# Patient Record
Sex: FEMALE | Race: WHITE | Employment: OTHER | URBAN - METROPOLITAN AREA
[De-identification: names, ages, dates, MRNs, and addresses within clinical notes are randomized per-mention and may not be internally consistent; named-entity substitution may affect disease eponyms.]

---

## 2018-09-22 ENCOUNTER — HOSPITAL ENCOUNTER (EMERGENCY)
Age: 78
Discharge: HOME | End: 2018-09-22
Payer: MEDICARE

## 2018-09-22 ENCOUNTER — APPOINTMENT (OUTPATIENT)
Dept: GENERAL RADIOLOGY | Age: 78
End: 2018-09-22
Payer: MEDICARE

## 2018-09-22 VITALS
DIASTOLIC BLOOD PRESSURE: 94 MMHG | TEMPERATURE: 98.2 F | HEART RATE: 81 BPM | OXYGEN SATURATION: 97 % | SYSTOLIC BLOOD PRESSURE: 148 MMHG | BODY MASS INDEX: 23.6 KG/M2 | HEIGHT: 61 IN | WEIGHT: 125 LBS | RESPIRATION RATE: 16 BRPM

## 2018-09-22 DIAGNOSIS — J40 BRONCHITIS: Primary | ICD-10-CM

## 2018-09-22 PROCEDURE — 6370000000 HC RX 637 (ALT 250 FOR IP): Performed by: PHYSICIAN ASSISTANT

## 2018-09-22 PROCEDURE — 71046 X-RAY EXAM CHEST 2 VIEWS: CPT

## 2018-09-22 PROCEDURE — 99283 EMERGENCY DEPT VISIT LOW MDM: CPT

## 2018-09-22 RX ORDER — ALBUTEROL SULFATE 90 UG/1
2 AEROSOL, METERED RESPIRATORY (INHALATION) EVERY 6 HOURS PRN
Qty: 1 INHALER | Refills: 0 | Status: SHIPPED | OUTPATIENT
Start: 2018-09-22 | End: 2018-10-03 | Stop reason: ALTCHOICE

## 2018-09-22 RX ORDER — PHENOL 1.4 %
1 AEROSOL, SPRAY (ML) MUCOUS MEMBRANE DAILY
Status: ON HOLD | COMMUNITY
End: 2020-01-01 | Stop reason: HOSPADM

## 2018-09-22 RX ORDER — IPRATROPIUM BROMIDE AND ALBUTEROL SULFATE 2.5; .5 MG/3ML; MG/3ML
1 SOLUTION RESPIRATORY (INHALATION)
Status: DISCONTINUED | OUTPATIENT
Start: 2018-09-22 | End: 2018-09-22 | Stop reason: HOSPADM

## 2018-09-22 RX ORDER — PANTOPRAZOLE SODIUM 40 MG/1
40 GRANULE, DELAYED RELEASE ORAL
COMMUNITY
End: 2019-08-13 | Stop reason: ALTCHOICE

## 2018-09-22 RX ORDER — CEFDINIR 300 MG/1
300 CAPSULE ORAL 2 TIMES DAILY
Qty: 20 CAPSULE | Refills: 0 | Status: SHIPPED | OUTPATIENT
Start: 2018-09-22 | End: 2018-10-02

## 2018-09-22 RX ORDER — PREDNISONE 10 MG/1
40 TABLET ORAL DAILY
Qty: 20 TABLET | Refills: 0 | Status: SHIPPED | OUTPATIENT
Start: 2018-09-22 | End: 2018-09-27

## 2018-09-22 RX ADMIN — IPRATROPIUM BROMIDE AND ALBUTEROL SULFATE 1 AMPULE: .5; 3 SOLUTION RESPIRATORY (INHALATION) at 13:56

## 2018-10-03 ENCOUNTER — HOSPITAL ENCOUNTER (EMERGENCY)
Age: 78
Discharge: HOME | End: 2018-10-03
Attending: EMERGENCY MEDICINE
Payer: MEDICARE

## 2018-10-03 ENCOUNTER — APPOINTMENT (OUTPATIENT)
Dept: GENERAL RADIOLOGY | Age: 78
End: 2018-10-03
Payer: MEDICARE

## 2018-10-03 VITALS
WEIGHT: 125 LBS | TEMPERATURE: 98.1 F | HEIGHT: 62 IN | HEART RATE: 74 BPM | SYSTOLIC BLOOD PRESSURE: 144 MMHG | RESPIRATION RATE: 16 BRPM | OXYGEN SATURATION: 98 % | DIASTOLIC BLOOD PRESSURE: 84 MMHG | BODY MASS INDEX: 23 KG/M2

## 2018-10-03 DIAGNOSIS — T50.905A ADVERSE EFFECT OF DRUG, INITIAL ENCOUNTER: ICD-10-CM

## 2018-10-03 DIAGNOSIS — E87.6 HYPOKALEMIA: ICD-10-CM

## 2018-10-03 DIAGNOSIS — R61 DIAPHORESIS: Primary | ICD-10-CM

## 2018-10-03 DIAGNOSIS — T88.7XXA MEDICATION SIDE EFFECT: ICD-10-CM

## 2018-10-03 LAB
ALBUMIN SERPL-MCNC: 4.3 G/DL (ref 3.5–5.2)
ALP BLD-CCNC: 68 U/L (ref 35–104)
ALT SERPL-CCNC: 8 U/L (ref 0–32)
AMYLASE: 69 U/L (ref 20–100)
ANION GAP SERPL CALCULATED.3IONS-SCNC: 14 MMOL/L (ref 7–16)
APTT: 32.2 SEC (ref 25.7–34.7)
AST SERPL-CCNC: 20 U/L (ref 0–31)
BACTERIA: ABNORMAL /HPF
BASOPHILS ABSOLUTE: 0.02 E9/L (ref 0–0.2)
BASOPHILS RELATIVE PERCENT: 0.2 % (ref 0–2)
BILIRUB SERPL-MCNC: 0.4 MG/DL (ref 0–1.2)
BILIRUBIN URINE: NEGATIVE
BLOOD, URINE: ABNORMAL
BUN BLDV-MCNC: 12 MG/DL (ref 8–23)
CALCIUM SERPL-MCNC: 9.5 MG/DL (ref 8.6–10.2)
CHLORIDE BLD-SCNC: 100 MMOL/L (ref 98–107)
CLARITY: CLEAR
CO2: 28 MMOL/L (ref 22–29)
COLOR: YELLOW
CREAT SERPL-MCNC: 0.8 MG/DL (ref 0.5–1)
EOSINOPHILS ABSOLUTE: 0.05 E9/L (ref 0.05–0.5)
EOSINOPHILS RELATIVE PERCENT: 0.6 % (ref 0–6)
GFR AFRICAN AMERICAN: >60
GFR NON-AFRICAN AMERICAN: >60 ML/MIN/1.73
GLUCOSE BLD-MCNC: 103 MG/DL (ref 74–109)
GLUCOSE URINE: NEGATIVE MG/DL
HCT VFR BLD CALC: 45.3 % (ref 34–48)
HEMOGLOBIN: 15.1 G/DL (ref 11.5–15.5)
IMMATURE GRANULOCYTES #: 0.04 E9/L
IMMATURE GRANULOCYTES %: 0.4 % (ref 0–5)
INR BLD: 1
KETONES, URINE: NEGATIVE MG/DL
LACTIC ACID, SEPSIS: 1.7 MMOL/L (ref 0.5–1.9)
LEUKOCYTE ESTERASE, URINE: ABNORMAL
LIPASE: 65 U/L (ref 13–60)
LYMPHOCYTES ABSOLUTE: 1.13 E9/L (ref 1.5–4)
LYMPHOCYTES RELATIVE PERCENT: 12.6 % (ref 20–42)
MCH RBC QN AUTO: 30.6 PG (ref 26–35)
MCHC RBC AUTO-ENTMCNC: 33.3 % (ref 32–34.5)
MCV RBC AUTO: 91.9 FL (ref 80–99.9)
MONOCYTES ABSOLUTE: 0.43 E9/L (ref 0.1–0.95)
MONOCYTES RELATIVE PERCENT: 4.8 % (ref 2–12)
NEUTROPHILS ABSOLUTE: 7.29 E9/L (ref 1.8–7.3)
NEUTROPHILS RELATIVE PERCENT: 81.4 % (ref 43–80)
NITRITE, URINE: NEGATIVE
PDW BLD-RTO: 13 FL (ref 11.5–15)
PH UA: 7 (ref 5–9)
PLATELET # BLD: 184 E9/L (ref 130–450)
PMV BLD AUTO: 10.8 FL (ref 7–12)
POTASSIUM SERPL-SCNC: 3 MMOL/L (ref 3.5–5)
PRO-BNP: 130 PG/ML (ref 0–450)
PROTEIN UA: NEGATIVE MG/DL
PROTHROMBIN TIME: 11.4 SEC (ref 9.3–12.4)
RBC # BLD: 4.93 E12/L (ref 3.5–5.5)
RBC UA: ABNORMAL /HPF (ref 0–2)
SODIUM BLD-SCNC: 142 MMOL/L (ref 132–146)
SPECIFIC GRAVITY UA: 1.01 (ref 1–1.03)
TOTAL PROTEIN: 7.2 G/DL (ref 6.4–8.3)
TROPONIN: <0.01 NG/ML (ref 0–0.03)
UROBILINOGEN, URINE: 0.2 E.U./DL
WBC # BLD: 9 E9/L (ref 4.5–11.5)
WBC UA: ABNORMAL /HPF (ref 0–5)

## 2018-10-03 PROCEDURE — 87088 URINE BACTERIA CULTURE: CPT

## 2018-10-03 PROCEDURE — 82150 ASSAY OF AMYLASE: CPT

## 2018-10-03 PROCEDURE — 36415 COLL VENOUS BLD VENIPUNCTURE: CPT

## 2018-10-03 PROCEDURE — 99283 EMERGENCY DEPT VISIT LOW MDM: CPT

## 2018-10-03 PROCEDURE — 93005 ELECTROCARDIOGRAM TRACING: CPT | Performed by: EMERGENCY MEDICINE

## 2018-10-03 PROCEDURE — 87040 BLOOD CULTURE FOR BACTERIA: CPT

## 2018-10-03 PROCEDURE — 84484 ASSAY OF TROPONIN QUANT: CPT

## 2018-10-03 PROCEDURE — 85730 THROMBOPLASTIN TIME PARTIAL: CPT

## 2018-10-03 PROCEDURE — 85025 COMPLETE CBC W/AUTO DIFF WBC: CPT

## 2018-10-03 PROCEDURE — 83880 ASSAY OF NATRIURETIC PEPTIDE: CPT

## 2018-10-03 PROCEDURE — 2580000003 HC RX 258: Performed by: EMERGENCY MEDICINE

## 2018-10-03 PROCEDURE — 83690 ASSAY OF LIPASE: CPT

## 2018-10-03 PROCEDURE — 87077 CULTURE AEROBIC IDENTIFY: CPT

## 2018-10-03 PROCEDURE — 71045 X-RAY EXAM CHEST 1 VIEW: CPT

## 2018-10-03 PROCEDURE — 80053 COMPREHEN METABOLIC PANEL: CPT

## 2018-10-03 PROCEDURE — 85610 PROTHROMBIN TIME: CPT

## 2018-10-03 PROCEDURE — 87186 SC STD MICRODIL/AGAR DIL: CPT

## 2018-10-03 PROCEDURE — 81001 URINALYSIS AUTO W/SCOPE: CPT

## 2018-10-03 PROCEDURE — 83605 ASSAY OF LACTIC ACID: CPT

## 2018-10-03 RX ORDER — METOCLOPRAMIDE 10 MG/1
10 TABLET ORAL 3 TIMES DAILY
COMMUNITY
End: 2018-10-03 | Stop reason: ALTCHOICE

## 2018-10-03 RX ORDER — 0.9 % SODIUM CHLORIDE 0.9 %
500 INTRAVENOUS SOLUTION INTRAVENOUS ONCE
Status: COMPLETED | OUTPATIENT
Start: 2018-10-03 | End: 2018-10-03

## 2018-10-03 RX ORDER — POTASSIUM CHLORIDE 20 MEQ/1
20 TABLET, EXTENDED RELEASE ORAL DAILY
Qty: 60 TABLET | Refills: 0 | Status: SHIPPED | OUTPATIENT
Start: 2018-10-03 | End: 2019-08-13

## 2018-10-03 RX ADMIN — SODIUM CHLORIDE 500 ML: 9 INJECTION, SOLUTION INTRAVENOUS at 05:39

## 2018-10-03 NOTE — ED PROVIDER NOTES
surgery (1996); Breast lumpectomy (Left, 2008); and Cystoscopy (6/18/2015). Social History:  reports that she has never smoked. She has never used smokeless tobacco. She reports that she drinks alcohol. She reports that she does not use drugs. Family History: family history is not on file. The patients home medications have been reviewed.     Allergies: Morphine    -------------------------------------------------- RESULTS -------------------------------------------------  All laboratory and radiology results have been personally reviewed by myself   LABS:  Results for orders placed or performed during the hospital encounter of 10/03/18   CBC auto differential   Result Value Ref Range    WBC 9.0 4.5 - 11.5 E9/L    RBC 4.93 3.50 - 5.50 E12/L    Hemoglobin 15.1 11.5 - 15.5 g/dL    Hematocrit 45.3 34.0 - 48.0 %    MCV 91.9 80.0 - 99.9 fL    MCH 30.6 26.0 - 35.0 pg    MCHC 33.3 32.0 - 34.5 %    RDW 13.0 11.5 - 15.0 fL    Platelets 615 244 - 941 E9/L    MPV 10.8 7.0 - 12.0 fL    Neutrophils % 81.4 (H) 43.0 - 80.0 %    Immature Granulocytes % 0.4 0.0 - 5.0 %    Lymphocytes % 12.6 (L) 20.0 - 42.0 %    Monocytes % 4.8 2.0 - 12.0 %    Eosinophils % 0.6 0.0 - 6.0 %    Basophils % 0.2 0.0 - 2.0 %    Neutrophils # 7.29 1.80 - 7.30 E9/L    Immature Granulocytes # 0.04 E9/L    Lymphocytes # 1.13 (L) 1.50 - 4.00 E9/L    Monocytes # 0.43 0.10 - 0.95 E9/L    Eosinophils # 0.05 0.05 - 0.50 E9/L    Basophils # 0.02 0.00 - 0.20 E9/L   Urinalysis   Result Value Ref Range    Color, UA Yellow Straw/Yellow    Clarity, UA Clear Clear    Glucose, Ur Negative Negative mg/dL    Bilirubin Urine Negative Negative    Ketones, Urine Negative Negative mg/dL    Specific Gravity, UA 1.010 1.005 - 1.030    Blood, Urine TRACE (A) Negative    pH, UA 7.0 5.0 - 9.0    Protein, UA Negative Negative mg/dL    Urobilinogen, Urine 0.2 <2.0 E.U./dL    Nitrite, Urine Negative Negative    Leukocyte Esterase, Urine TRACE (A) Negative   Lactate, Sepsis Result Value Ref Range    Lactic Acid, Sepsis 1.7 0.5 - 1.9 mmol/L   APTT   Result Value Ref Range    aPTT 32.2 25.7 - 34.7 sec   Protime-INR   Result Value Ref Range    Protime 11.4 9.3 - 12.4 sec    INR 1.0    Amylase   Result Value Ref Range    Amylase 69 20 - 100 U/L   Lipase   Result Value Ref Range    Lipase 65 (H) 13 - 60 U/L   Troponin   Result Value Ref Range    Troponin <0.01 0.00 - 0.03 ng/mL   Comprehensive Metabolic Panel   Result Value Ref Range    Sodium 142 132 - 146 mmol/L    Potassium 3.0 (L) 3.5 - 5.0 mmol/L    Chloride 100 98 - 107 mmol/L    CO2 28 22 - 29 mmol/L    Anion Gap 14 7 - 16 mmol/L    Glucose 103 74 - 109 mg/dL    BUN 12 8 - 23 mg/dL    CREATININE 0.8 0.5 - 1.0 mg/dL    GFR Non-African American >60 >=60 mL/min/1.73    GFR African American >60     Calcium 9.5 8.6 - 10.2 mg/dL    Total Protein 7.2 6.4 - 8.3 g/dL    Alb 4.3 3.5 - 5.2 g/dL    Total Bilirubin 0.4 0.0 - 1.2 mg/dL    Alkaline Phosphatase 68 35 - 104 U/L    ALT 8 0 - 32 U/L    AST 20 0 - 31 U/L   Brain Natriuretic Peptide   Result Value Ref Range    Pro- 0 - 450 pg/mL   Microscopic Urinalysis   Result Value Ref Range    WBC, UA 0-1 0 - 5 /HPF    RBC, UA NONE 0 - 2 /HPF    Bacteria, UA RARE (A) /HPF   EKG 12 lead   Result Value Ref Range    Ventricular Rate 71 BPM    Atrial Rate 71 BPM    P-R Interval 144 ms    QRS Duration 82 ms    Q-T Interval 388 ms    QTc Calculation (Bazett) 421 ms    P Axis 44 degrees    R Axis 14 degrees    T Axis 62 degrees       RADIOLOGY:  Interpreted by Radiologist.  XR CHEST 1 VW   Final Result   Tortuous ectatic aorta   Pulmonary nodule at the left lung base measuring 8 mm                      ------------------------- NURSING NOTES AND VITALS REVIEWED ---------------------------  The nursing notes within the ED encounter and vital signs as below have been reviewed.    BP (!) 144/84   Pulse 74   Temp 98.1 °F (36.7 °C) (Oral)   Resp 16   Ht 5' 2\" (1.575 m)   Wt 125 lb (56.7 kg)   SpO2 98%   BMI 22.86 kg/m²   Oxygen Saturation Interpretation: Normal      ---------------------------------------------------PHYSICAL EXAM--------------------------------------      Constitutional/General: Alert and oriented x3, well appearing, non toxic in NAD at this time  Head: Normocephalic and atraumatic  Eyes: PERRL, EOMI, no scleral icterus, no photophobia  Mouth: Oropharynx clear, handling secretions, no trismus  Neck: Supple, full ROM, Trachea midline, no JVD  Pulmonary: Lungs clear to auscultation bilaterally, no wheezes, rales, or rhonchi. Not in respiratory distress  Cardiovascular:  Regular rate and rhythm, no murmurs, gallops, or rubs. 2+ distal pulses  Abdomen: Soft, non tender, non distended, no HPSM, no masses, no rebound no guarding, no rigidity, no pulsatile masses. Normal BS  Extremities: Moves all extremities x 4. Warm and well perfused; no c/c/e, no clinical signs of DVT. Skin: warm and dry without rash; no diaphoresis present at this time. Neurologic: GCS 15, CN's 2-12 grossly intact, no facial asymetry nor droop. No motor weakness, no sensory deficits, No meningeal signs. Psych: Normal Affect; no signs of depression nor psychosis.    ------------------------------ ED COURSE/MEDICAL DECISION MAKING----------------------  Medications   0.9 % sodium chloride bolus (0 mLs Intravenous Stopped 10/3/18 0609)       ED COURSE:     Medical Decision Making:   Differential Diagnoses:  Medication Reaction, Pneumonia,MI/ Anginal Equivalent,  UTI, Sepsis,  Metabolic/Electrolyte disorder, Vasovagal reaction, Cardiac Dysrhythmia, Dehydration, to name a few    EKG #1:  Interpreted by emergency department physician unless otherwise noted. Time:  04:53    Rate: 75  Rhythm: Sinus. Interpretation: nonspecific ST and T waves changes. No ischemic changes compared to old EKG 10/05/2012    Counseling:   The emergency provider has spoken with the patient and family member patient and daughter and discussed todays

## 2018-10-03 NOTE — ED NOTES
IV removed. Dressing applied. Discharge instructions and prescription given. Patient states verbal understanding. Ambulatory to exit.        Sabi Rowley RN  10/03/18 [id number]

## 2018-10-05 LAB
EKG ATRIAL RATE: 71 BPM
EKG P AXIS: 44 DEGREES
EKG P-R INTERVAL: 144 MS
EKG Q-T INTERVAL: 388 MS
EKG QRS DURATION: 82 MS
EKG QTC CALCULATION (BAZETT): 421 MS
EKG R AXIS: 14 DEGREES
EKG T AXIS: 62 DEGREES
EKG VENTRICULAR RATE: 71 BPM

## 2018-10-08 LAB
BLOOD CULTURE, ROUTINE: NORMAL
CULTURE, BLOOD 2: NORMAL
ORGANISM: ABNORMAL
URINE CULTURE, ROUTINE: ABNORMAL
URINE CULTURE, ROUTINE: ABNORMAL

## 2019-01-01 ENCOUNTER — TELEPHONE (OUTPATIENT)
Dept: CASE MANAGEMENT | Age: 79
End: 2019-01-01

## 2019-01-01 ENCOUNTER — HOSPITAL ENCOUNTER (OUTPATIENT)
Dept: RADIATION ONCOLOGY | Age: 79
Discharge: HOME | End: 2019-11-25
Attending: RADIOLOGY
Payer: MEDICARE

## 2019-01-01 DIAGNOSIS — C21.0 ANAL CANCER (HCC): Primary | ICD-10-CM

## 2019-01-01 PROCEDURE — 99212 OFFICE O/P EST SF 10 MIN: CPT

## 2019-01-01 PROCEDURE — 99212 OFFICE O/P EST SF 10 MIN: CPT | Performed by: RADIOLOGY

## 2019-03-21 ENCOUNTER — HOSPITAL ENCOUNTER (OUTPATIENT)
Age: 79
Discharge: HOME | End: 2019-03-23

## 2019-03-21 PROCEDURE — 88331 PATH CONSLTJ SURG 1 BLK 1SPC: CPT

## 2019-03-21 PROCEDURE — 88342 IMHCHEM/IMCYTCHM 1ST ANTB: CPT

## 2019-03-21 PROCEDURE — 88309 TISSUE EXAM BY PATHOLOGIST: CPT

## 2019-03-21 PROCEDURE — 88341 IMHCHEM/IMCYTCHM EA ADD ANTB: CPT

## 2019-04-17 NOTE — PROGRESS NOTES
Toan Sanon  1940 [de-identified] y.o. Referring Physician:  Dr Lázaro Alonzo    PCP: Javier Jackson MD     There were no vitals filed for this visit. Wt Readings from Last 3 Encounters:   10/03/18 125 lb (56.7 kg)   09/22/18 125 lb (56.7 kg)   06/16/15 123 lb (55.8 kg)        There is no height or weight on file to calculate BMI. Chief Complaint: No chief complaint on file. Cancer Staging  No matching staging information was found for the patient. Prior Radiation Therapy? YES: Site Treated: l breast          Facility: [facility]          Date: 2009    Concurrent Chemo/radiation? NO    Prior Chemotherapy? NO    Prior Hormonal Therapy? YES: Site Treated: l breast          Facility: -          Date: 2009    Head and Neck Cancer? No, patient does NOT have HN cancer. Current Outpatient Medications   Medication Sig Dispense Refill    potassium chloride (KLOR-CON M) 20 MEQ extended release tablet Take 1 tablet by mouth daily 60 tablet 0    pantoprazole sodium (PROTONIX) 40 MG PACK packet Take 40 mg by mouth every morning (before breakfast)      calcium carbonate 600 MG TABS tablet Take 1 tablet by mouth daily      omeprazole (PRILOSEC) 10 MG capsule Take 10 mg by mouth daily      vitamin B-12 (CYANOCOBALAMIN) 1000 MCG tablet Take 1,000 mcg by mouth daily      magnesium gluconate (MAGONATE) 500 MG tablet Take 500 mg by mouth nightly      pravastatin (PRAVACHOL) 20 MG tablet Take 20 mg by mouth daily.  hydrochlorothiazide (HYDRODIURIL) 25 MG tablet Take 25 mg by mouth daily. No current facility-administered medications for this encounter.         Past Medical History:   Diagnosis Date    Breast [facility]) 2008    Hyperlipidemia     Hypertension     Kidney stone     Water retention        Past Surgical History:   Procedure Laterality Date    BACK SURGERY  1996    BREAST LUMPECTOMY Left 2008    CYSTOSCOPY  6/18/2015    cysto, retro, pyelogram, laser lithotripsy, stent insertion    EYE SURGERY Bilateral     HYSTERECTOMY         No family history on file. Social History     Socioeconomic History    Marital status:      Spouse name: Not on file    Number of children: Not on file    Years of education: Not on file    Highest education level: Not on file   Occupational History    Not on file   Social Needs    Financial resource strain: Not on file    Food insecurity:     Worry: Not on file     Inability: Not on file    Transportation needs:     Medical: Not on file     Non-medical: Not on file   Tobacco Use    Smoking status: Never Smoker    Smokeless tobacco: Never Used   Substance and Sexual Activity    Alcohol use: Yes     Comment: sociaL    Drug use: No    Sexual activity: Not on file   Lifestyle    Physical activity:     Days per week: Not on file     Minutes per session: Not on file    Stress: Not on file   Relationships    Social connections:     Talks on phone: Not on file     Gets together: Not on file     Attends Taoist service: Not on file     Active member of club or organization: Not on file     Attends meetings of clubs or organizations: Not on file     Relationship status: Not on file    Intimate partner violence:     Fear of current or ex partner: Not on file     Emotionally abused: Not on file     Physically abused: Not on file     Forced sexual activity: Not on file   Other Topics Concern    Not on file   Social History Narrative    Not on file           Occupation: Retired  Retired:  YES: Patient is retired from office cleaning. REVIEW OF SYSTEMS: <<For Level 5, 10 or more systems>>     Marty is an [de-identified]year old female with a newly diagnosed anal cancer. She has a history of early stage breast cancer and treatment in 2009 with Dr Otoniel Hammer. She had a lumpectomy, adjuvant radiation followed by 5 years of aromatase inhibitors. (Anastrazole)  Her most recent mammogram in 2018 was negative.   Recently she developed a troublesome anal irritation with some blood tinged stools. 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 Primary tumor was 3cm. There was invasion of submucosa as well as deep margins of excision by invasive carcinoma and the inferior skin margin was also involved by high grade squamous intraepithelial lesion. 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 She states that she feels well, maintains an active lifestyle, and her bowel movements have been normal.  Port placement this Thursday 4/25/2019. CT Simulation (per Lort RT) Tuesday 4/23/19 at 12:00    Greater than 20 minutes was spent educating patient on radiation therapy, simulation, and self-care. Slide show, handout, and verbal presentation all utilized. This patient verbalized understanding and has no questions at this time. He was instructed to reach out to this clinic with any related needs and contact information was provided. Pacemaker/Defibulator/ICD:  No    .  Mediport: No        FALLS RISK SCREENING ASSESSMENT    Instructions:  Assess the patient and enter the appropriate indicators that are present for fall risk identification. Total the numbers entered and assign a fall risk score from Table 2.  Reassess patient at a minimum every 12 weeks or with status change. Assessment   Date  4/17/2019     1. Mental Ability: confusion/cognitively impaired No - 0       2. Elimination Issues: incontinence, frequency No - 0       3. Ambulatory: use of assistive devices (walker, cane, off-loading devices), attached to equipment (IV pole, oxygen) No - 0     4. Sensory Limitations: dizziness, vertigo, impaired vision No - 0       5. Age 72 years or greater - 1       10.   Medication: diuretics, strong analgesics, hypnotics, sedatives, antihypertensive agents   No - 0   7. Falls:  recent history of falls within the last 3 months (not to include slipping or tripping)   No - 0   TOTAL 1    If score of 4 or greater was education given? No       TABLE 2   Risk Score Risk Level Plan of Care   0-3 Little or  No Risk 1. Provide assistance as indicated for ambulation activities  2. Reorient confused/cognitively impaired patient  3. Call-light/bell within patient's reach  4. Chair/bed in low position, stretcher/bed with siderails up except when performing patient care activities  5. Educate patient/family/caregiver on falls prevention  6.  Reassess in 12 weeks or with any noted change in patient condition which places them at a risk for a fall   4-6 Moderate Risk 1. Provide assistance as indicated for ambulation activities  2. Reorient confused/cognitively impaired patient  3. Call-light/bell within patient's reach  4. Chair/bed in low position, stretcher/bed with siderails up except when performing patient care activities  5. Educate patient/family/caregiver on falls prevention  6. Falls risk precaution (Yellow sticker Level II) placed on patient chart   7 or   Higher High Risk 1. Place patient in easily observable treatment room  2. Patient attended at all times by family member or staff  3. Provide assistance as indicated for ambulation activities  4. Reorient confused/cognitively impaired patient  5. Call-light/bell within patient's reach  6. Chair/bed in low position, stretcher/bed with siderails up except when performing patient care activities  7. Educate patient/family/caregiver on falls prevention  8. Falls risk precaution (Yellow sticker Level III) placed on patient chart           MALNUTRITION RISK SCREENING ASSESSMENT    Instructions:  Assess the patient and enter the appropriate indicators that are present for nutrition risk identification. Total the numbers entered and assign a risk score.  Follow the symptoms so a referral to Lymphedema Therapy can be considered. PREHAB AUDIOLOGY REFERRAL    - Is patient planned to receive Cisplatin? No. This patient is not planned to start Cisplatin. - Is patient planned to receive radiation therapy that may be directed toward auditory canals or nerves? No. Patient is not planned to start radiation therapy to auditory canals or nerves. - Is patient complaining of new onset hearing loss? No. Patient is not complaining of new onset hearing loss. Patient education given on Radiation therapy. The patient expresses understanding and acceptance of instructions.  Juventino Robertson 4/17/2019 3:39 PM           Juventino Robertson

## 2019-04-18 ENCOUNTER — HOSPITAL ENCOUNTER (OUTPATIENT)
Dept: CT IMAGING | Age: 79
Discharge: HOME | End: 2019-04-20
Payer: MEDICARE

## 2019-04-18 DIAGNOSIS — C21.1 ADENOCARCINOMA OF ANAL CANAL (HCC): ICD-10-CM

## 2019-04-18 PROCEDURE — 6360000004 HC RX CONTRAST MEDICATION: Performed by: RADIOLOGY

## 2019-04-18 PROCEDURE — 2580000003 HC RX 258: Performed by: INTERNAL MEDICINE

## 2019-04-18 PROCEDURE — 71260 CT THORAX DX C+: CPT

## 2019-04-18 PROCEDURE — 74177 CT ABD & PELVIS W/CONTRAST: CPT

## 2019-04-18 RX ORDER — SODIUM CHLORIDE 0.9 % (FLUSH) 0.9 %
10 SYRINGE (ML) INJECTION PRN
Status: DISCONTINUED | OUTPATIENT
Start: 2019-04-18 | End: 2019-04-21 | Stop reason: HOSPADM

## 2019-04-18 RX ADMIN — IOPAMIDOL 100 ML: 755 INJECTION, SOLUTION INTRAVENOUS at 14:10

## 2019-04-18 RX ADMIN — Medication 10 ML: at 14:11

## 2019-04-18 RX ADMIN — IOHEXOL 50 ML: 240 INJECTION, SOLUTION INTRATHECAL; INTRAVASCULAR; INTRAVENOUS; ORAL at 14:10

## 2019-04-22 ENCOUNTER — TELEPHONE (OUTPATIENT)
Dept: RADIATION ONCOLOGY | Age: 79
End: 2019-04-22

## 2019-04-22 ENCOUNTER — HOSPITAL ENCOUNTER (OUTPATIENT)
Dept: RADIATION ONCOLOGY | Age: 79
Discharge: HOME | End: 2019-04-22
Payer: MEDICARE

## 2019-04-22 ENCOUNTER — TELEPHONE (OUTPATIENT)
Dept: CASE MANAGEMENT | Age: 79
End: 2019-04-22

## 2019-04-22 DIAGNOSIS — C21.0 ANAL CANCER (HCC): Primary | ICD-10-CM

## 2019-04-22 PROCEDURE — 99205 OFFICE O/P NEW HI 60 MIN: CPT

## 2019-04-22 PROCEDURE — 99204 OFFICE O/P NEW MOD 45 MIN: CPT | Performed by: RADIOLOGY

## 2019-04-22 SDOH — ECONOMIC STABILITY: HOUSING INSECURITY: PLEASE ASSESS YOUR PATIENT'S LEVEL OF DISTRESS CONCERNING HOUSING (SCALE FROM 1-10): 1

## 2019-04-22 NOTE — TELEPHONE ENCOUNTER
Met with patient and her significant other after her initial consult appointment with Dr. Ra Hart for her recent diagnosis of second primary invasive carcinoma with deep and inferior margins positive status post excision biopsy on 3/5/19. I explained my role as Nurse Navigator and provided my contact information. Patient is scheduled for her CT-simulation on 4/23/19 and is having her mediport placed on 4/24/19. Currently, she is scheduled to start chemotherapy on 4/29/19 per Dr. Jelly Downey. I advised patient that, preferably, chemotherapy and radiation should start on the same day and that I do not expect her radiation plan to be completed by that time. I told her that I will call Dr. Eh Mendoza office to report such and she should keep whatever appointment they advise her of. I advised patient of Freddie Zepeda RD and told her that Xi Bey will be meeting with her soon to make dietary recommendations / guidance to her throughout treatment for possible diarrhea. She denies any current transportation, emotional, or financial issues. Patient states she has a very strong family / friend support group. She is very active and dances with her significant other on a weekly basis. I provided literature consisting of Cancer Guide (Patient Resource), our listing of Local Resources for the Cancer Survivor, and [address] [place] [address]. I encouraged her to stop by my office or contact me with any future questions and/or concerns. Understanding was verbalized of all. Afterwards, I consulted with Dr. Ra Hart who states radiation should be ready to start on 5/6/19. I called Dr. Eh Mendoza office and spoke with Matthew Tarango RN to advise. Delta Severs states Dr. Jelly Downey is out of the office all this week. She would either have patient keep her existing 4/29/19 appointment there to follow up with Dr. Jelly Downey and have CBC done or she will arrange to have patient rescheduled for all, including chemotherapy start, for 5/6/19.

## 2019-04-22 NOTE — ADDENDUM NOTE
Encounter addended by: EJ Khan on: 4/22/2019 2:42 PM   Actions taken: Charge Capture section accepted

## 2019-04-22 NOTE — ADDENDUM NOTE
Encounter addended by: Jessica Napier RN on: 4/22/2019 2:30 PM   Actions taken: Charge Capture section accepted

## 2019-04-22 NOTE — TELEPHONE ENCOUNTER
At rad onc consult, spoke with pt re: ACS programs, resources, and role of ACS navigator's contact information. Pt stated that she has no needs at this time but would follow up if needs arise. Provided with ACS navigator's contact information.

## 2019-04-22 NOTE — PROGRESS NOTES
Radiation Oncology Consult Note         4/22/2019    Lyndon Peralta  [de-identified] y.o.   1940        Referring Physician: Dr. Delisa Verde        PCP:  Yolanda Borrego MD      DIAGNOSIS:   Invasive carcinoma anal canal, basaloid squamous, stage T2 N0 M0-II      History of Present Illness:   Ms. Lyndon Peralta  is a [de-identified]y.o. year old female, with past history of left breast stage I invasive carcinoma, status post lumpectomy and radiation therapy 2009 followed by 5 years of anastrozole. She has history of hemorrhoids. 6 months ago she had a colonoscopy which was negative. Treatment for hemorrhoids didn't work and she continued to have periodic anal bleeding. No soreness, incontinence or weight loss. No systemic symptoms. She had an  EUA on 3/25/19. Underwent transanal excision biopsy of the anal lesion, showing  invasive carcinoma, basaloid squamous. Deep and inferior margins positive. See report below  CANCER CASE SUMMARY  Specimen: Anorectal junction  Procedure: Local excision (transanal excision)  Specimen Integrity: Intact  Tumor Site: Anorectal junction  Tumor Size: 3.0x1.3x0.3 cm  Histologic Type: Basaloid squamous cell carcinoma  Histologic Grade: G3 (poorly differentiated)  Microscopic Tumor Extension: Tumor invades submucosa  Margins:       Deep (posterior/submucosal) margin: Focally involved by invasive  carcinoma (A4)       Inferior/anal skin margin: Focally involved by high grade squamous  intraepithelial lesion (A5)  Treatment Effect: No known presurgical therapy  Lymph-Vascular Invasion: Not identified  Perineural Invasion: Not identified  Pathologic Staging (pTNM, AJCC 8th Edition)   Primary tumor (pT): pT2 (tumor </= 5cm)  Additional Pathologic Findings: High-grade squamous intraepithelial  lesion/in situ carcinoma and condyloma   acuminata    CT scan of the chest abdomen pelvis from 4/18/19,  negative for local or metastatic disease.     Past Medical History:      Diagnosis Date    [facility]) 2008    Hyperlipidemia     Hypertension     Kidney stone     Water retention        Past Surgical History:      Procedure Laterality Date    BACK SURGERY  1996    BREAST LUMPECTOMY Left 2008    CYSTOSCOPY  6/18/2015    cysto, retro, pyelogram, laser lithotripsy, stent insertion    EYE SURGERY Bilateral     HYSTERECTOMY         Allergies   Allergen Reactions    Morphine Other (See Comments)     \"not myself\"    Reglan [Metoclopramide] Other (See Comments)     Anxiety, diaphoresis       Medications:  Medications reviewed and reconciled. Current Outpatient Medications   Medication Sig Dispense Refill    potassium chloride (KLOR-CON M) 20 MEQ extended release tablet Take 1 tablet by mouth daily 60 tablet 0    pantoprazole sodium (PROTONIX) 40 MG PACK packet Take 40 mg by mouth every morning (before breakfast)      calcium carbonate 600 MG TABS tablet Take 1 tablet by mouth daily      omeprazole (PRILOSEC) 10 MG capsule Take 10 mg by mouth daily      vitamin B-12 (CYANOCOBALAMIN) 1000 MCG tablet Take 1,000 mcg by mouth daily      magnesium gluconate (MAGONATE) 500 MG tablet Take 500 mg by mouth nightly      pravastatin (PRAVACHOL) 20 MG tablet Take 20 mg by mouth daily.  hydrochlorothiazide (HYDRODIURIL) 25 MG tablet Take 25 mg by mouth daily. No current facility-administered medications for this encounter. ObGyn history-[place]/BSO several years ago for benign reasons      Family History:  History reviewed. No pertinent family history. Social History:       reports that she has never smoked. She has never used smokeless tobacco..   reports that she drinks alcohol. .   reports that she does not use drugs. Review of Systems:  Obtained from the patient, chart review and nursing assessment. Negative otherwise    Constitutional:  No fever, chills or night sweats. Denies recent weight loss. Eyes:  No blurred or changes in vision. Denies discharge or pain.   ENT:  No headaches, hearing loss or vertigo. No mouth sores or sore throat. No change in taste or smell. Cardiovascular:  No chest discomfort, dyspnea on exertion or palpitations. Respiratory: Has no cough or wheezing. Has no sputum production or hemoptysis. Has no pleuritic pain. Gastrointestinal: No abdominal pain, appetite loss or nausea. No change in bowel habits. No hematochezia or melena. Genitourinary: Patient acknowledges no dysuria, trouble voiding, urgency or hematuria. No nocturia or increased frequency. Musculoskeletal: No gait disturbance, weakness or joint complaints. Integumentary: No rash or pruritis. Neurological: No headache, diplopia, syncope, change in muscle strength, numbness or tingling. No change in gait, balance, coordination, mood, affect, memory, mentation, behavior. Psychiatric: No anxiety, or depression. Endocrine: No temperature intolerance. No excessive thirst, fluid intake, or urination. No tremor. Hematologic/Lymphatic: No abnormal bruising or bleeding, blood clots or swollen lymph nodes. Allergic/Immunologic: No nasal congestion or hives. Physical examination: There were no vitals filed for this visit. ECOG Performance Status: 0      Constitutional: Looks healthy    ENT: Throat is clear    Lymphatic: No peripheral lymphadenopathy    Breast exam: Left breast with mild fibrosis. No signs of recurrence. Right breast normal to palpation    Respiratory: Lungs clear bilaterally    Cardiovascular:.  S1-S2 RRR      Abdomen:  Benign    Musculoskeletal:Extremities: .   Trace bipedal edema    CNS: Grossly non-focal    Radiation safety and oncologic treatment support:    - Previous radiation history:  No  - History of autoimmune or connective tissue disease:  No  - Nutritional support/ PEG:  Not applicable  - Dental evaluation:  Not applicable  -  requested:  Not asked for.  - Oncology Nurse navigator requested:  - Transportation for daily treatment:  Self    Other

## 2019-04-23 ENCOUNTER — APPOINTMENT (OUTPATIENT)
Dept: RADIATION ONCOLOGY | Age: 79
End: 2019-04-23
Attending: RADIOLOGY
Payer: MEDICARE

## 2019-04-23 PROCEDURE — 77334 RADIATION TREATMENT AID(S): CPT | Performed by: RADIOLOGY

## 2019-04-29 PROCEDURE — 77300 RADIATION THERAPY DOSE PLAN: CPT | Performed by: RADIOLOGY

## 2019-04-29 PROCEDURE — 77301 RADIOTHERAPY DOSE PLAN IMRT: CPT | Performed by: RADIOLOGY

## 2019-04-29 PROCEDURE — 77338 DESIGN MLC DEVICE FOR IMRT: CPT | Performed by: RADIOLOGY

## 2019-05-06 ENCOUNTER — HOSPITAL ENCOUNTER (OUTPATIENT)
Dept: RADIATION ONCOLOGY | Age: 79
Discharge: HOME | End: 2019-05-06
Attending: RADIOLOGY
Payer: MEDICARE

## 2019-05-06 PROCEDURE — 77386 HC NTSTY MODUL RAD TX DLVR CPLX: CPT | Performed by: RADIOLOGY

## 2019-05-06 PROCEDURE — 77014 HC CT TREATMENT PLAN: CPT | Performed by: RADIOLOGY

## 2019-05-07 ENCOUNTER — HOSPITAL ENCOUNTER (OUTPATIENT)
Dept: RADIATION ONCOLOGY | Age: 79
Discharge: HOME | End: 2019-05-07
Attending: RADIOLOGY
Payer: MEDICARE

## 2019-05-07 PROCEDURE — 77386 HC NTSTY MODUL RAD TX DLVR CPLX: CPT | Performed by: RADIOLOGY

## 2019-05-07 PROCEDURE — 77014 HC CT TREATMENT PLAN: CPT | Performed by: RADIOLOGY

## 2019-05-08 ENCOUNTER — HOSPITAL ENCOUNTER (OUTPATIENT)
Dept: RADIATION ONCOLOGY | Age: 79
Discharge: HOME | End: 2019-05-08
Attending: RADIOLOGY
Payer: MEDICARE

## 2019-05-08 PROCEDURE — 77386 HC NTSTY MODUL RAD TX DLVR CPLX: CPT | Performed by: RADIOLOGY

## 2019-05-08 PROCEDURE — 77014 HC CT TREATMENT PLAN: CPT | Performed by: RADIOLOGY

## 2019-05-09 ENCOUNTER — APPOINTMENT (OUTPATIENT)
Dept: RADIATION ONCOLOGY | Age: 79
End: 2019-05-09
Attending: RADIOLOGY
Payer: MEDICARE

## 2019-05-09 ENCOUNTER — HOSPITAL ENCOUNTER (OUTPATIENT)
Dept: RADIATION ONCOLOGY | Age: 79
Discharge: HOME | End: 2019-05-09
Attending: RADIOLOGY
Payer: MEDICARE

## 2019-05-09 VITALS — DIASTOLIC BLOOD PRESSURE: 78 MMHG | BODY MASS INDEX: 22.13 KG/M2 | WEIGHT: 121 LBS | SYSTOLIC BLOOD PRESSURE: 126 MMHG

## 2019-05-09 DIAGNOSIS — C21.0 ANAL CANCER (HCC): Primary | ICD-10-CM

## 2019-05-09 PROCEDURE — 99999 PR OFFICE/OUTPT VISIT,PROCEDURE ONLY: CPT | Performed by: RADIOLOGY

## 2019-05-09 PROCEDURE — 77386 HC NTSTY MODUL RAD TX DLVR CPLX: CPT | Performed by: RADIOLOGY

## 2019-05-09 PROCEDURE — 77014 HC CT TREATMENT PLAN: CPT | Performed by: RADIOLOGY

## 2019-05-09 NOTE — PROGRESS NOTES
DEPARTMENT OF RADIATION ONCOLOGY   ON TREATMENT VISIT       5/9/2019      NAME:  Elise Sanon    YOB: 1940      Diagnosis:  1. Anal cancer ([address]        SUBJECTIVE:   Brissa Sanon status post  720 cGy to the pelvis for anal cancer. She is constipated but otherwise no symptoms. She is alsot getting concurrent chemotherapy         Physical Examination:       Wt Readings from Last 3 Encounters:   05/09/19 121 lb (54.9 kg)   10/03/18 125 lb (56.7 kg)   09/22/18 125 lb (56.7 kg)       Labs:  Lab Results   Component Value Date    WBC 9.0 10/03/2018    RBC 4.93 10/03/2018    HGB 15.1 10/03/2018    HCT 45.3 10/03/2018    MCV 91.9 10/03/2018    MCH 30.6 10/03/2018    MCHC 33.3 10/03/2018    RDW 13.0 10/03/2018     10/03/2018    MPV 10.8 10/03/2018            ASSESSMENT/PLAN:     Continue treatment as planned      Fannie Baptiste M.D.   Radiation Oncologist  Karthikeyan: [phone]   Janelle Garcia: [phone] Jumper: [phone]   Janelle Garcia: [phone])

## 2019-05-10 ENCOUNTER — APPOINTMENT (OUTPATIENT)
Dept: RADIATION ONCOLOGY | Age: 79
End: 2019-05-10
Attending: RADIOLOGY
Payer: MEDICARE

## 2019-05-10 PROCEDURE — 77014 HC CT TREATMENT PLAN: CPT | Performed by: RADIOLOGY

## 2019-05-10 PROCEDURE — 77386 HC NTSTY MODUL RAD TX DLVR CPLX: CPT | Performed by: RADIOLOGY

## 2019-05-10 PROCEDURE — 77336 RADIATION PHYSICS CONSULT: CPT | Performed by: RADIOLOGY

## 2019-05-13 ENCOUNTER — APPOINTMENT (OUTPATIENT)
Dept: RADIATION ONCOLOGY | Age: 79
End: 2019-05-13
Attending: RADIOLOGY
Payer: MEDICARE

## 2019-05-13 PROCEDURE — 77386 HC NTSTY MODUL RAD TX DLVR CPLX: CPT | Performed by: RADIOLOGY

## 2019-05-13 PROCEDURE — 77014 HC CT TREATMENT PLAN: CPT | Performed by: RADIOLOGY

## 2019-05-14 ENCOUNTER — HOSPITAL ENCOUNTER (OUTPATIENT)
Dept: RADIATION ONCOLOGY | Age: 79
Discharge: HOME | End: 2019-05-14
Attending: RADIOLOGY
Payer: MEDICARE

## 2019-05-14 PROCEDURE — 77386 HC NTSTY MODUL RAD TX DLVR CPLX: CPT | Performed by: RADIOLOGY

## 2019-05-14 PROCEDURE — 77014 HC CT TREATMENT PLAN: CPT | Performed by: RADIOLOGY

## 2019-05-14 NOTE — PROGRESS NOTES
DEPARTMENT OF RADIATION ONCOLOGY        ON TREATMENT VISIT                              5/14/2019      NAME:  Staci Sanon    YOB: 1940    Diagnosis: Invasive carcinoma anal canal, basaloid squamous stage T2 N0 M0-II    SUBJECTIVE:   Alicia Toscano has now received 1260 cGy in 7/28 fractions directed to the pelvis    Patient seen after receiving today's XRT. Denies skin complaints or pain to treatment site. Denies fevers, chills, nausea/ vomiting, diarrhea, pelvic pain, or urinary symptoms. Patient follows with Dr. Aryan Petty, Medical Oncologist at the [facility]. Past medical, surgical, social and family histories reviewed and updated as indicated. Summary of Pertinent History:  · Invasive ductal carcinoma of the left breast, T1b, N0, M0, 2009. S/p lumpectomy and left axillary LND. · Radiation therapy to left breast begin 11/14/2009 and completed 01/04/2010. · AI therapy x 5 years (Anastrozole)  · Most recent Mammogram 2018: negative. · Anorectal irritation, blood tinged stool, 2019. · Evaluated per General Surgeon Dr. Mary Balderas. Dx  Anal lesion. 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 Deep (posterior/ submucosal) margin and inferior/ anal skin margin positive. · CT chest; CT abd/pelvis completed 04/18/2019:  Negative metastatic disease. Pain: Denies pain complaints.      ALLERGIES:  Morphine and Reglan [metoclopramide]       Current Outpatient Medications   Medication Sig Dispense Refill    potassium chloride (KLOR-CON M) 20 MEQ extended release tablet Take 1 tablet by mouth daily 60 tablet 0    pantoprazole sodium (PROTONIX) 40 MG PACK packet Take 40 mg by mouth every morning (before breakfast)      calcium carbonate 600 MG TABS tablet Take 1 tablet by mouth daily      omeprazole (PRILOSEC) 10 MG capsule Take 10 mg by mouth daily      vitamin B-12 (CYANOCOBALAMIN) 1000 MCG tablet Take 1,000 mcg by mouth daily      magnesium gluconate (MAGONATE) 500 MG tablet Take 500 mg by mouth nightly      pravastatin (PRAVACHOL) 20 MG tablet Take 20 mg by mouth daily.  hydrochlorothiazide (HYDRODIURIL) 25 MG tablet Take 25 mg by mouth daily. No current facility-administered medications for this encounter. OBJECTIVE:     Wt Readings from Last 3 Encounters:   05/09/19 121 lb (54.9 kg)   10/03/18 125 lb (56.7 kg)   09/22/18 125 lb (56.7 kg)       Alert and fully ambulatory. Pleasant and conversant. ASSESSMENT/PLAN:     Patient is tolerating treatments well with expected toxicities. Skin care discussed including Aquaphor healing ointment; lukewarm Sitz baths as needed for comfort/ cleansing, and use of water wipes for personal cleansing post void/ defection. Current and planned dose reviewed. Goals of treatment and potential side effects were reviewed with the patient. Questions answered to apparent satisfaction. Treatments will continue as planned.     Flakito Ortiz, MSN, APRN-CNP  Certified Nurse Practitioner for [facility] [address]  Phone: [phone]: [phone]

## 2019-05-15 ENCOUNTER — HOSPITAL ENCOUNTER (OUTPATIENT)
Dept: RADIATION ONCOLOGY | Age: 79
Discharge: HOME | End: 2019-05-15
Attending: RADIOLOGY
Payer: MEDICARE

## 2019-05-15 PROCEDURE — 77386 HC NTSTY MODUL RAD TX DLVR CPLX: CPT | Performed by: RADIOLOGY

## 2019-05-15 PROCEDURE — 77014 HC CT TREATMENT PLAN: CPT | Performed by: RADIOLOGY

## 2019-05-16 ENCOUNTER — HOSPITAL ENCOUNTER (OUTPATIENT)
Dept: RADIATION ONCOLOGY | Age: 79
Discharge: HOME | End: 2019-05-16
Attending: RADIOLOGY
Payer: MEDICARE

## 2019-05-16 VITALS — BODY MASS INDEX: 22.31 KG/M2 | SYSTOLIC BLOOD PRESSURE: 127 MMHG | DIASTOLIC BLOOD PRESSURE: 68 MMHG | WEIGHT: 122 LBS

## 2019-05-16 DIAGNOSIS — C21.0 ANAL CANCER (HCC): Primary | ICD-10-CM

## 2019-05-16 PROCEDURE — 77014 HC CT TREATMENT PLAN: CPT | Performed by: RADIOLOGY

## 2019-05-16 PROCEDURE — 77386 HC NTSTY MODUL RAD TX DLVR CPLX: CPT | Performed by: RADIOLOGY

## 2019-05-16 PROCEDURE — 99999 PR OFFICE/OUTPT VISIT,PROCEDURE ONLY: CPT | Performed by: RADIOLOGY

## 2019-05-16 NOTE — PROGRESS NOTES
Brissa Sanon  5/16/2019  Wt Readings from Last 3 Encounters:   05/16/19 122 lb (55.3 kg)   05/09/19 121 lb (54.9 kg)   10/03/18 125 lb (56.7 kg)     Body mass index is 22.31 kg/m². Treatment Area:pelvis    Patient was seen today for weekly visit. Comfort Alteration  KPS:90%  Fatigue: Mild    Mucous Membrane Alteration  Drainage: No  Drainage Odor: No  Vaginal Bleeding: No    Nutritional Alteration  Anorexia: No  Nausea: No  Vomiting: No     Elimination Alterations  Constipation: no  Diarrhea:  no  Urinary Frequency/Urgency: No  Urinary Retention: No  Dysuria: No  Urinary Incontinence: No      Skin Alteration   Sensation:Denies    Radiation Dermatitis:  NA    Emotional  Coping: effective    Sexuality Alteration  absent    Injury, potential bleeding or infection: NA    Lab Results   Component Value Date    WBC 9.0 10/03/2018     10/03/2018         /68   Wt 122 lb (55.3 kg)   BMI 22.31 kg/m²   BP within normal range?  yes   -if no, manually recheck in 5-10 min        Assessment/Plan:9/ 1620cGy  No complaints today, Bowels MARTÍN Escamilla

## 2019-05-16 NOTE — PROGRESS NOTES
DEPARTMENT OF RADIATION ONCOLOGY   ON TREATMENT VISIT       2019      NAME:  Yolanda Sanon    YOB: 1940      Diagnosis:  1. Anal cancer ([address]        SUBJECTIVE:   Garon Douse status post  1620 cGy to the anal canal plus lymph nodes. She is stable. Only occasional mild postprandial cramping which resolve on its own. Stools are soft. No other symptoms. She is moisturizing the skin. Physical Examination:       Wt Readings from Last 3 Encounters:   19 122 lb (55.3 kg)   19 121 lb (54.9 kg)   10/03/18 125 lb (56.7 kg)       Labs:  Lab Results   Component Value Date    WBC 9.0 10/03/2018    RBC 4.93 10/03/2018    HGB 15.1 10/03/2018    HCT 45.3 10/03/2018    MCV 91.9 10/03/2018    MCH 30.6 10/03/2018    MCHC 33.3 10/03/2018    RDW 13.0 10/03/2018     10/03/2018    MPV 10.8 10/03/2018            ASSESSMENT/PLAN:     Continue treatment as planned      Cristi Singh M.D.   Radiation Oncologist  [place]: [phone]   Sue Jose: [phone] Sell: [phone]   Sue Jose: [phone])

## 2019-05-17 ENCOUNTER — HOSPITAL ENCOUNTER (OUTPATIENT)
Dept: RADIATION ONCOLOGY | Age: 79
Discharge: HOME | End: 2019-05-17
Attending: RADIOLOGY
Payer: MEDICARE

## 2019-05-17 PROCEDURE — 77336 RADIATION PHYSICS CONSULT: CPT | Performed by: RADIOLOGY

## 2019-05-17 PROCEDURE — 77386 HC NTSTY MODUL RAD TX DLVR CPLX: CPT | Performed by: RADIOLOGY

## 2019-05-17 PROCEDURE — 77014 HC CT TREATMENT PLAN: CPT | Performed by: RADIOLOGY

## 2019-05-23 ENCOUNTER — HOSPITAL ENCOUNTER (OUTPATIENT)
Dept: RADIATION ONCOLOGY | Age: 79
Discharge: HOME | End: 2019-05-23
Attending: RADIOLOGY
Payer: MEDICARE

## 2019-05-23 VITALS
WEIGHT: 123 LBS | BODY MASS INDEX: 22.5 KG/M2 | HEART RATE: 67 BPM | DIASTOLIC BLOOD PRESSURE: 82 MMHG | SYSTOLIC BLOOD PRESSURE: 122 MMHG

## 2019-05-23 DIAGNOSIS — C21.0 ANAL CANCER (HCC): Primary | ICD-10-CM

## 2019-05-23 PROCEDURE — 77386 HC NTSTY MODUL RAD TX DLVR CPLX: CPT | Performed by: RADIOLOGY

## 2019-05-23 PROCEDURE — 99999 PR OFFICE/OUTPT VISIT,PROCEDURE ONLY: CPT | Performed by: RADIOLOGY

## 2019-05-24 ENCOUNTER — HOSPITAL ENCOUNTER (OUTPATIENT)
Dept: RADIATION ONCOLOGY | Age: 79
Discharge: HOME | End: 2019-05-24
Attending: RADIOLOGY
Payer: MEDICARE

## 2019-05-24 PROCEDURE — 77386 HC NTSTY MODUL RAD TX DLVR CPLX: CPT | Performed by: RADIOLOGY

## 2019-05-28 ENCOUNTER — HOSPITAL ENCOUNTER (OUTPATIENT)
Dept: RADIATION ONCOLOGY | Age: 79
End: 2019-05-28
Attending: RADIOLOGY
Payer: MEDICARE

## 2019-05-28 PROCEDURE — 77386 HC NTSTY MODUL RAD TX DLVR CPLX: CPT | Performed by: RADIOLOGY

## 2019-05-29 ENCOUNTER — HOSPITAL ENCOUNTER (OUTPATIENT)
Dept: RADIATION ONCOLOGY | Age: 79
Discharge: HOME | End: 2019-05-29
Attending: RADIOLOGY
Payer: MEDICARE

## 2019-05-29 PROCEDURE — 77386 HC NTSTY MODUL RAD TX DLVR CPLX: CPT | Performed by: RADIOLOGY

## 2019-05-30 ENCOUNTER — HOSPITAL ENCOUNTER (OUTPATIENT)
Dept: RADIATION ONCOLOGY | Age: 79
Discharge: HOME | End: 2019-05-30
Attending: RADIOLOGY
Payer: MEDICARE

## 2019-05-30 VITALS — DIASTOLIC BLOOD PRESSURE: 70 MMHG | SYSTOLIC BLOOD PRESSURE: 118 MMHG | WEIGHT: 121 LBS | BODY MASS INDEX: 22.13 KG/M2

## 2019-05-30 DIAGNOSIS — C21.0 ANAL CANCER (HCC): Primary | ICD-10-CM

## 2019-05-30 PROCEDURE — 77386 HC NTSTY MODUL RAD TX DLVR CPLX: CPT | Performed by: RADIOLOGY

## 2019-05-30 PROCEDURE — 77336 RADIATION PHYSICS CONSULT: CPT | Performed by: RADIOLOGY

## 2019-05-30 PROCEDURE — 99999 PR OFFICE/OUTPT VISIT,PROCEDURE ONLY: CPT | Performed by: RADIOLOGY

## 2019-05-30 NOTE — PROGRESS NOTES
DEPARTMENT OF RADIATION ONCOLOGY   ON TREATMENT VISIT       2019      NAME:  Angelia Sanon    YOB: 1940      Diagnosis:  1. Anal cancer ([address]        SUBJECTIVE:   Brissa Sanon status post  2700 cGy to the pelvis. She is stable. No nausea vomiting or rectal symptoms. No skin symptoms. Next chemotherapy early next week         Physical Examination:       Wt Readings from Last 3 Encounters:   19 121 lb (54.9 kg)   19 123 lb (55.8 kg)   19 122 lb (55.3 kg)       Labs:  Lab Results   Component Value Date    WBC 9.0 10/03/2018    RBC 4.93 10/03/2018    HGB 15.1 10/03/2018    HCT 45.3 10/03/2018    MCV 91.9 10/03/2018    MCH 30.6 10/03/2018    MCHC 33.3 10/03/2018    RDW 13.0 10/03/2018     10/03/2018    MPV 10.8 10/03/2018            ASSESSMENT/PLAN: Questions answered    Continue treatment as planned      Beavermelvin Campbell M.D.   Radiation Oncologist  [place]: [phone]   Marilyn Cornell: [phone] Conroy: [phone]   Marilyn Cornell: [phone])

## 2019-05-31 ENCOUNTER — HOSPITAL ENCOUNTER (OUTPATIENT)
Dept: RADIATION ONCOLOGY | Age: 79
Discharge: HOME | End: 2019-05-31
Attending: RADIOLOGY
Payer: MEDICARE

## 2019-05-31 PROCEDURE — 77386 HC NTSTY MODUL RAD TX DLVR CPLX: CPT | Performed by: RADIOLOGY

## 2019-06-03 ENCOUNTER — HOSPITAL ENCOUNTER (OUTPATIENT)
Dept: RADIATION ONCOLOGY | Age: 79
Discharge: HOME | End: 2019-06-03
Attending: RADIOLOGY
Payer: MEDICARE

## 2019-06-03 PROCEDURE — 77386 HC NTSTY MODUL RAD TX DLVR CPLX: CPT | Performed by: RADIOLOGY

## 2019-06-04 ENCOUNTER — HOSPITAL ENCOUNTER (OUTPATIENT)
Dept: RADIATION ONCOLOGY | Age: 79
Discharge: HOME | End: 2019-06-04
Attending: RADIOLOGY
Payer: MEDICARE

## 2019-06-04 PROCEDURE — 77386 HC NTSTY MODUL RAD TX DLVR CPLX: CPT | Performed by: RADIOLOGY

## 2019-06-05 ENCOUNTER — HOSPITAL ENCOUNTER (OUTPATIENT)
Dept: RADIATION ONCOLOGY | Age: 79
Discharge: HOME | End: 2019-06-05
Attending: RADIOLOGY
Payer: MEDICARE

## 2019-06-05 PROCEDURE — 77386 HC NTSTY MODUL RAD TX DLVR CPLX: CPT | Performed by: RADIOLOGY

## 2019-06-06 ENCOUNTER — HOSPITAL ENCOUNTER (OUTPATIENT)
Dept: RADIATION ONCOLOGY | Age: 79
Discharge: HOME | End: 2019-06-06
Attending: RADIOLOGY
Payer: MEDICARE

## 2019-06-06 VITALS
BODY MASS INDEX: 21.95 KG/M2 | DIASTOLIC BLOOD PRESSURE: 80 MMHG | WEIGHT: 120 LBS | HEART RATE: 74 BPM | RESPIRATION RATE: 16 BRPM | SYSTOLIC BLOOD PRESSURE: 124 MMHG

## 2019-06-06 DIAGNOSIS — C21.0 ANAL CANCER (HCC): Primary | ICD-10-CM

## 2019-06-06 PROCEDURE — 99999 PR OFFICE/OUTPT VISIT,PROCEDURE ONLY: CPT | Performed by: RADIOLOGY

## 2019-06-06 PROCEDURE — 77336 RADIATION PHYSICS CONSULT: CPT | Performed by: RADIOLOGY

## 2019-06-06 PROCEDURE — 77386 HC NTSTY MODUL RAD TX DLVR CPLX: CPT | Performed by: RADIOLOGY

## 2019-06-07 ENCOUNTER — HOSPITAL ENCOUNTER (OUTPATIENT)
Dept: RADIATION ONCOLOGY | Age: 79
Discharge: HOME | End: 2019-06-07
Attending: RADIOLOGY
Payer: MEDICARE

## 2019-06-07 ENCOUNTER — HOSPITAL ENCOUNTER (OUTPATIENT)
Dept: RADIATION ONCOLOGY | Age: 79
End: 2019-06-07
Attending: RADIOLOGY
Payer: MEDICARE

## 2019-06-07 DIAGNOSIS — T66.XXXA RADIATION THERAPY COMPLICATION, INITIAL ENCOUNTER: Primary | ICD-10-CM

## 2019-06-07 PROCEDURE — 77386 HC NTSTY MODUL RAD TX DLVR CPLX: CPT | Performed by: RADIOLOGY

## 2019-06-07 NOTE — PROGRESS NOTES
DEPARTMENT OF RADIATION ONCOLOGY        ON TREATMENT VISIT                              6/10/2019      NAME:  Bonnie Sanon    YOB: 1940    Diagnosis: Invasive carcinoma anal canal, basaloid squamous stage T2 N0 M0-II    SUBJECTIVE:   Gracia Sequeira has now received 3780 cGy in 21/28 fractions directed to the pelvis    Patient seen today for skin reaction. Patient reports some skin irritation. Denies fevers, chills, pelvic pain or urinary symptoms. Patient follows with Dr. Myke Caro, Medical Oncologist at the [facility]. Past medical, surgical, social and family histories reviewed and updated as indicated. Summary of Pertinent History:  · Invasive ductal carcinoma of the left breast, T1b, N0, M0, 2009. S/p lumpectomy and left axillary LND. · Radiation therapy to left breast begin 11/14/2009 and completed 01/04/2010. · AI therapy x 5 years (Anastrozole)  · Most recent Mammogram 2018: negative. · Anorectal irritation, blood tinged stool, 2019. · Evaluated per General Surgeon Dr. Cameron Harmon. Dx  Anal lesion. 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 Deep (posterior/ submucosal) margin and inferior/ anal skin margin positive. · CT chest; CT abd/pelvis completed 04/18/2019:  Negative metastatic disease. Pain: Skin irritation.      ALLERGIES:  Morphine and Reglan [metoclopramide]       Current Outpatient Medications   Medication Sig Dispense Refill    silver sulfADIAZINE (SILVADENE) 1 % cream Apply topically 2 times daily 50 g 2    potassium chloride (KLOR-CON M) 20 MEQ extended release tablet Take 1 tablet by mouth daily 60 tablet 0    pantoprazole sodium (PROTONIX) 40 MG PACK packet Take 40 mg by mouth every morning (before breakfast)      calcium carbonate 600 MG TABS tablet Take 1 tablet by mouth daily      omeprazole (PRILOSEC) 10 MG capsule Take 10

## 2019-06-10 ENCOUNTER — TELEPHONE (OUTPATIENT)
Dept: CASE MANAGEMENT | Age: 79
End: 2019-06-10

## 2019-06-10 DIAGNOSIS — C21.0 ANAL CANCER (HCC): Primary | ICD-10-CM

## 2019-06-10 RX ORDER — CLOTRIMAZOLE AND BETAMETHASONE DIPROPIONATE 10; .64 MG/G; MG/G
CREAM TOPICAL
Qty: 45 G | Refills: 1 | Status: SHIPPED | OUTPATIENT
Start: 2019-06-10 | End: 2019-08-13 | Stop reason: ALTCHOICE

## 2019-06-10 NOTE — ADDENDUM NOTE
Encounter addended by: Brandon Fong RN on: 6/10/2019 12:49 PM   Actions taken: Order Reconciliation Section accessed, Pharmacy for encounter modified

## 2019-06-10 NOTE — TELEPHONE ENCOUNTER
Patient stopped in my office to report that she had a CBC done at [facility] this morning and brought her results to our office which will be scanned into her Epic chart. Patient states she has significant radiation burns and is requesting a break. Dr. Markie Martinez to assess.

## 2019-06-13 ENCOUNTER — HOSPITAL ENCOUNTER (OUTPATIENT)
Dept: RADIATION ONCOLOGY | Age: 79
End: 2019-06-13
Attending: RADIOLOGY
Payer: MEDICARE

## 2019-06-13 NOTE — PROGRESS NOTES
Radiation Treatment Summary    Patient Name:  Guido Marin,  1940,  [de-identified] y.o., female       Referring Physician: Dr. Blair Damon      PCP: Rashida Watkins MD       Diagnosis:  Invasive carcinoma anal canal, basaloid squamous, stage T2 N0 M0-II           Narrative: Patient started concurrent chemoradiation therapy, following transanal excision of anal carcinoma      Date XRT start: 5/6/2019. Date XRT ended (discontinued by patient) 6/7/2019    Dose received: 3780 cGy/21 fractions      Response/Tolerance: Initially patient tolerated the treatment quite well with no side effects. However, after 21 treatments she developed perianal rash. This was treated conservatively. However, patient elected to discontinue treatment despite knowing the risks.     Follow-up: 6 weeks or as needed      Emilia Jimenez , [address]:  [phone]   FAX:    [phone]: [phone]   FAX:  [phone]

## 2019-06-14 ENCOUNTER — APPOINTMENT (OUTPATIENT)
Dept: RADIATION ONCOLOGY | Age: 79
End: 2019-06-14
Attending: RADIOLOGY
Payer: MEDICARE

## 2019-06-17 ENCOUNTER — APPOINTMENT (OUTPATIENT)
Dept: RADIATION ONCOLOGY | Age: 79
End: 2019-06-17
Attending: RADIOLOGY
Payer: MEDICARE

## 2019-06-18 ENCOUNTER — APPOINTMENT (OUTPATIENT)
Dept: RADIATION ONCOLOGY | Age: 79
End: 2019-06-18
Attending: RADIOLOGY
Payer: MEDICARE

## 2019-06-19 ENCOUNTER — APPOINTMENT (OUTPATIENT)
Dept: RADIATION ONCOLOGY | Age: 79
End: 2019-06-19
Attending: RADIOLOGY
Payer: MEDICARE

## 2019-06-20 ENCOUNTER — NURSE ONLY (OUTPATIENT)
Dept: RADIATION ONCOLOGY | Age: 79
End: 2019-06-20

## 2019-06-20 ENCOUNTER — APPOINTMENT (OUTPATIENT)
Dept: RADIATION ONCOLOGY | Age: 79
End: 2019-06-20
Attending: RADIOLOGY
Payer: MEDICARE

## 2019-06-20 RX ORDER — CLOTRIMAZOLE AND BETAMETHASONE DIPROPIONATE 10; .64 MG/G; MG/G
CREAM TOPICAL
Qty: 45 G | Refills: 1 | Status: SHIPPED | OUTPATIENT
Start: 2019-06-20 | End: 2019-08-13

## 2019-08-13 ENCOUNTER — HOSPITAL ENCOUNTER (OUTPATIENT)
Dept: RADIATION ONCOLOGY | Age: 79
Discharge: HOME | End: 2019-08-13
Attending: RADIOLOGY
Payer: MEDICARE

## 2019-08-13 DIAGNOSIS — C21.0 ANAL CANCER (HCC): Primary | ICD-10-CM

## 2019-08-13 PROCEDURE — 99999 PR OFFICE/OUTPT VISIT,PROCEDURE ONLY: CPT | Performed by: NURSE PRACTITIONER

## 2019-08-13 RX ORDER — DOCUSATE SODIUM 100 MG/1
200 CAPSULE, LIQUID FILLED ORAL NIGHTLY
COMMUNITY

## 2019-08-13 SDOH — HEALTH STABILITY: MENTAL HEALTH: HOW MANY STANDARD DRINKS CONTAINING ALCOHOL DO YOU HAVE ON A TYPICAL DAY?: NOT ASKED

## 2019-08-14 VITALS
WEIGHT: 121.13 LBS | HEART RATE: 74 BPM | SYSTOLIC BLOOD PRESSURE: 118 MMHG | DIASTOLIC BLOOD PRESSURE: 68 MMHG | HEIGHT: 61 IN | BODY MASS INDEX: 22.87 KG/M2 | TEMPERATURE: 98.2 F | RESPIRATION RATE: 18 BRPM

## 2019-08-16 ENCOUNTER — TELEPHONE (OUTPATIENT)
Dept: CASE MANAGEMENT | Age: 79
End: 2019-08-16

## 2019-09-08 ENCOUNTER — APPOINTMENT (OUTPATIENT)
Dept: CT IMAGING | Age: 79
End: 2019-09-08
Payer: MEDICARE

## 2019-09-08 ENCOUNTER — HOSPITAL ENCOUNTER (EMERGENCY)
Age: 79
Discharge: HOME | End: 2019-09-08
Attending: EMERGENCY MEDICINE
Payer: MEDICARE

## 2019-09-08 VITALS
DIASTOLIC BLOOD PRESSURE: 84 MMHG | OXYGEN SATURATION: 100 % | BODY MASS INDEX: 21.9 KG/M2 | SYSTOLIC BLOOD PRESSURE: 162 MMHG | TEMPERATURE: 98 F | RESPIRATION RATE: 16 BRPM | HEIGHT: 62 IN | WEIGHT: 119 LBS | HEART RATE: 72 BPM

## 2019-09-08 DIAGNOSIS — S00.03XA CONTUSION OF SCALP, INITIAL ENCOUNTER: ICD-10-CM

## 2019-09-08 DIAGNOSIS — S09.90XA CLOSED HEAD INJURY, INITIAL ENCOUNTER: Primary | ICD-10-CM

## 2019-09-08 PROCEDURE — 70450 CT HEAD/BRAIN W/O DYE: CPT

## 2019-09-08 PROCEDURE — 99283 EMERGENCY DEPT VISIT LOW MDM: CPT

## 2019-09-08 ASSESSMENT — PAIN DESCRIPTION - DESCRIPTORS: DESCRIPTORS: ACHING

## 2019-09-08 ASSESSMENT — PAIN DESCRIPTION - PAIN TYPE: TYPE: ACUTE PAIN

## 2019-09-08 ASSESSMENT — PAIN SCALES - GENERAL: PAINLEVEL_OUTOF10: 5

## 2019-09-08 ASSESSMENT — PAIN DESCRIPTION - LOCATION: LOCATION: HEAD

## 2019-09-24 ENCOUNTER — HOSPITAL ENCOUNTER (OUTPATIENT)
Age: 79
Discharge: HOME | End: 2019-09-26

## 2019-09-24 PROCEDURE — 88300 SURGICAL PATH GROSS: CPT

## 2019-11-21 PROBLEM — C21.0 SQUAMOUS CELL CANCER, ANUS (HCC): Status: ACTIVE | Noted: 2019-01-01

## 2020-01-01 ENCOUNTER — OFFICE VISIT (OUTPATIENT)
Dept: HEMATOLOGY | Age: 80
End: 2020-01-01
Payer: MEDICARE

## 2020-01-01 ENCOUNTER — HOSPITAL ENCOUNTER (OUTPATIENT)
Dept: CT IMAGING | Age: 80
Discharge: HOME | End: 2020-03-12
Payer: MEDICARE

## 2020-01-01 ENCOUNTER — HOSPITAL ENCOUNTER (OUTPATIENT)
Dept: GENERAL RADIOLOGY | Age: 80
Discharge: HOME | End: 2020-07-10
Payer: MEDICARE

## 2020-01-01 ENCOUNTER — TELEPHONE (OUTPATIENT)
Dept: PALLATIVE CARE | Age: 80
End: 2020-01-01

## 2020-01-01 ENCOUNTER — APPOINTMENT (OUTPATIENT)
Dept: CT IMAGING | Age: 80
DRG: 436 | End: 2020-01-01
Payer: MEDICARE

## 2020-01-01 ENCOUNTER — HOSPITAL ENCOUNTER (OUTPATIENT)
Age: 80
Discharge: HOME | End: 2020-04-29

## 2020-01-01 ENCOUNTER — APPOINTMENT (OUTPATIENT)
Dept: ULTRASOUND IMAGING | Age: 80
DRG: 436 | End: 2020-01-01
Payer: MEDICARE

## 2020-01-01 ENCOUNTER — TELEPHONE (OUTPATIENT)
Dept: HEMATOLOGY | Age: 80
End: 2020-01-01

## 2020-01-01 ENCOUNTER — HOSPITAL ENCOUNTER (OUTPATIENT)
Age: 80
Discharge: HOME | End: 2020-07-10
Payer: MEDICARE

## 2020-01-01 ENCOUNTER — APPOINTMENT (OUTPATIENT)
Dept: GENERAL RADIOLOGY | Age: 80
DRG: 436 | End: 2020-01-01
Payer: MEDICARE

## 2020-01-01 ENCOUNTER — HOSPITAL ENCOUNTER (INPATIENT)
Age: 80
LOS: 4 days | Discharge: HOME | DRG: 436 | End: 2020-08-28
Attending: EMERGENCY MEDICINE | Admitting: INTERNAL MEDICINE
Payer: MEDICARE

## 2020-01-01 VITALS
BODY MASS INDEX: 21.16 KG/M2 | SYSTOLIC BLOOD PRESSURE: 140 MMHG | DIASTOLIC BLOOD PRESSURE: 71 MMHG | TEMPERATURE: 97.1 F | HEIGHT: 62 IN | RESPIRATION RATE: 18 BRPM | HEART RATE: 85 BPM | OXYGEN SATURATION: 93 % | WEIGHT: 115 LBS

## 2020-01-01 VITALS
HEART RATE: 94 BPM | RESPIRATION RATE: 18 BRPM | BODY MASS INDEX: 21.03 KG/M2 | DIASTOLIC BLOOD PRESSURE: 48 MMHG | OXYGEN SATURATION: 97 % | TEMPERATURE: 97 F | SYSTOLIC BLOOD PRESSURE: 118 MMHG | HEIGHT: 62 IN

## 2020-01-01 LAB
ABO/RH: NORMAL
AFP-TUMOR MARKER: 8 NG/ML (ref 0–9)
ALBUMIN SERPL-MCNC: 2.5 G/DL (ref 3.5–5.2)
ALBUMIN SERPL-MCNC: 2.6 G/DL (ref 3.5–5.2)
ALBUMIN SERPL-MCNC: 2.7 G/DL (ref 3.5–5.2)
ALBUMIN SERPL-MCNC: 2.8 G/DL (ref 3.5–5.2)
ALBUMIN SERPL-MCNC: 3.2 G/DL (ref 3.5–5.2)
ALP BLD-CCNC: 587 U/L (ref 35–104)
ALP BLD-CCNC: 638 U/L (ref 35–104)
ALP BLD-CCNC: 658 U/L (ref 35–104)
ALP BLD-CCNC: 668 U/L (ref 35–104)
ALP BLD-CCNC: 771 U/L (ref 35–104)
ALT SERPL-CCNC: 74 U/L (ref 0–32)
ALT SERPL-CCNC: 74 U/L (ref 0–32)
ALT SERPL-CCNC: 77 U/L (ref 0–32)
ALT SERPL-CCNC: 96 U/L (ref 0–32)
ALT SERPL-CCNC: 99 U/L (ref 0–32)
AMMONIA: 35 UMOL/L (ref 11–51)
AMYLASE: 34 U/L (ref 20–100)
ANION GAP SERPL CALCULATED.3IONS-SCNC: 14 MMOL/L (ref 7–16)
ANION GAP SERPL CALCULATED.3IONS-SCNC: 18 MMOL/L (ref 7–16)
ANISOCYTOSIS: ABNORMAL
ANTIBODY SCREEN: NORMAL
APTT: 28.4 SEC (ref 24.5–35.1)
AST SERPL-CCNC: 228 U/L (ref 0–31)
AST SERPL-CCNC: 249 U/L (ref 0–31)
AST SERPL-CCNC: 268 U/L (ref 0–31)
AST SERPL-CCNC: 307 U/L (ref 0–31)
AST SERPL-CCNC: 309 U/L (ref 0–31)
BACTERIA: ABNORMAL /HPF
BASOPHILS ABSOLUTE: 0.04 E9/L (ref 0–0.2)
BASOPHILS RELATIVE PERCENT: 0.3 % (ref 0–2)
BILIRUB SERPL-MCNC: 10.4 MG/DL (ref 0–1.2)
BILIRUB SERPL-MCNC: 10.8 MG/DL (ref 0–1.2)
BILIRUB SERPL-MCNC: 9.4 MG/DL (ref 0–1.2)
BILIRUB SERPL-MCNC: 9.6 MG/DL (ref 0–1.2)
BILIRUB SERPL-MCNC: 9.9 MG/DL (ref 0–1.2)
BILIRUBIN DIRECT: 6.7 MG/DL (ref 0–0.3)
BILIRUBIN DIRECT: 7.3 MG/DL (ref 0–0.3)
BILIRUBIN DIRECT: 7.4 MG/DL (ref 0–0.3)
BILIRUBIN DIRECT: 8.6 MG/DL (ref 0–0.3)
BILIRUBIN URINE: ABNORMAL
BILIRUBIN, INDIRECT: 1.8 MG/DL (ref 0–1)
BILIRUBIN, INDIRECT: 2.1 MG/DL (ref 0–1)
BILIRUBIN, INDIRECT: 2.2 MG/DL (ref 0–1)
BLOOD, URINE: ABNORMAL
BUN BLDV-MCNC: 16 MG/DL (ref 8–23)
BUN BLDV-MCNC: 22 MG/DL (ref 8–23)
CA 15-3: 117 U/ML (ref 0–31)
CALCIUM SERPL-MCNC: 10.3 MG/DL (ref 8.6–10.2)
CALCIUM SERPL-MCNC: 9.2 MG/DL (ref 8.6–10.2)
CEA: 106.5 NG/ML (ref 0–5.2)
CHLORIDE BLD-SCNC: 84 MMOL/L (ref 98–107)
CHLORIDE BLD-SCNC: 93 MMOL/L (ref 98–107)
CLARITY: CLEAR
CO2: 26 MMOL/L (ref 22–29)
CO2: 31 MMOL/L (ref 22–29)
COLOR: YELLOW
CREAT SERPL-MCNC: 0.6 MG/DL (ref 0.5–1)
CREAT SERPL-MCNC: 0.7 MG/DL (ref 0.5–1)
EOSINOPHILS ABSOLUTE: 0.05 E9/L (ref 0.05–0.5)
EOSINOPHILS RELATIVE PERCENT: 0.4 % (ref 0–6)
GFR AFRICAN AMERICAN: >60
GFR AFRICAN AMERICAN: >60
GFR NON-AFRICAN AMERICAN: >60 ML/MIN/1.73
GFR NON-AFRICAN AMERICAN: >60 ML/MIN/1.73
GLUCOSE BLD-MCNC: 77 MG/DL (ref 74–99)
GLUCOSE BLD-MCNC: 84 MG/DL (ref 74–99)
GLUCOSE URINE: NEGATIVE MG/DL
HCT VFR BLD CALC: 35.9 % (ref 34–48)
HCT VFR BLD CALC: 38.5 % (ref 34–48)
HCT VFR BLD CALC: 43 % (ref 34–48)
HEMOGLOBIN: 11.8 G/DL (ref 11.5–15.5)
HEMOGLOBIN: 12.8 G/DL (ref 11.5–15.5)
HEMOGLOBIN: 13.6 G/DL (ref 11.5–15.5)
IMMATURE GRANULOCYTES #: 0.17 E9/L
IMMATURE GRANULOCYTES %: 1.3 % (ref 0–5)
INR BLD: 1.1
INR BLD: 1.4
INR BLD: 1.5
KETONES, URINE: NEGATIVE MG/DL
LACTIC ACID: 3.2 MMOL/L (ref 0.5–2.2)
LEUKOCYTE ESTERASE, URINE: ABNORMAL
LIPASE: 24 U/L (ref 13–60)
LYMPHOCYTES ABSOLUTE: 0.92 E9/L (ref 1.5–4)
LYMPHOCYTES RELATIVE PERCENT: 7.3 % (ref 20–42)
MCH RBC QN AUTO: 31.3 PG (ref 26–35)
MCH RBC QN AUTO: 31.4 PG (ref 26–35)
MCHC RBC AUTO-ENTMCNC: 32.9 % (ref 32–34.5)
MCHC RBC AUTO-ENTMCNC: 33.2 % (ref 32–34.5)
MCV RBC AUTO: 94.1 FL (ref 80–99.9)
MCV RBC AUTO: 95.5 FL (ref 80–99.9)
MONOCYTES ABSOLUTE: 1.16 E9/L (ref 0.1–0.95)
MONOCYTES RELATIVE PERCENT: 9.2 % (ref 2–12)
NEUTROPHILS ABSOLUTE: 10.3 E9/L (ref 1.8–7.3)
NEUTROPHILS RELATIVE PERCENT: 81.5 % (ref 43–80)
NITRITE, URINE: NEGATIVE
OVALOCYTES: ABNORMAL
PDW BLD-RTO: 22.8 FL (ref 11.5–15)
PDW BLD-RTO: 23.5 FL (ref 11.5–15)
PH UA: 7.5 (ref 5–9)
PLATELET # BLD: 172 E9/L (ref 130–450)
PLATELET # BLD: 202 E9/L (ref 130–450)
PMV BLD AUTO: 11.5 FL (ref 7–12)
PMV BLD AUTO: 11.7 FL (ref 7–12)
POIKILOCYTES: ABNORMAL
POLYCHROMASIA: ABNORMAL
POTASSIUM SERPL-SCNC: 2.5 MMOL/L (ref 3.5–5)
POTASSIUM SERPL-SCNC: 4 MMOL/L (ref 3.5–5)
PRO-BNP: 647 PG/ML (ref 0–450)
PROTEIN UA: NEGATIVE MG/DL
PROTHROMBIN TIME: 12.6 SEC (ref 9.3–12.4)
PROTHROMBIN TIME: 16.3 SEC (ref 9.3–12.4)
PROTHROMBIN TIME: 16.5 SEC (ref 9.3–12.4)
RBC # BLD: 3.76 E12/L (ref 3.5–5.5)
RBC # BLD: 4.09 E12/L (ref 3.5–5.5)
RBC UA: ABNORMAL /HPF (ref 0–2)
SCHISTOCYTES: ABNORMAL
SODIUM BLD-SCNC: 133 MMOL/L (ref 132–146)
SODIUM BLD-SCNC: 133 MMOL/L (ref 132–146)
SPECIFIC GRAVITY UA: <=1.005 (ref 1–1.03)
TARGET CELLS: ABNORMAL
TOTAL PROTEIN: 5.2 G/DL (ref 6.4–8.3)
TOTAL PROTEIN: 5.3 G/DL (ref 6.4–8.3)
TOTAL PROTEIN: 5.5 G/DL (ref 6.4–8.3)
TOTAL PROTEIN: 5.6 G/DL (ref 6.4–8.3)
TOTAL PROTEIN: 6.3 G/DL (ref 6.4–8.3)
TROPONIN: <0.01 NG/ML (ref 0–0.03)
UROBILINOGEN, URINE: >=8 E.U./DL
WBC # BLD: 11 E9/L (ref 4.5–11.5)
WBC # BLD: 12.6 E9/L (ref 4.5–11.5)
WBC UA: ABNORMAL /HPF (ref 0–5)

## 2020-01-01 PROCEDURE — 6360000004 HC RX CONTRAST MEDICATION: Performed by: RADIOLOGY

## 2020-01-01 PROCEDURE — 74177 CT ABD & PELVIS W/CONTRAST: CPT

## 2020-01-01 PROCEDURE — 99233 SBSQ HOSP IP/OBS HIGH 50: CPT | Performed by: INTERNAL MEDICINE

## 2020-01-01 PROCEDURE — 99213 OFFICE O/P EST LOW 20 MIN: CPT | Performed by: TRANSPLANT SURGERY

## 2020-01-01 PROCEDURE — 81001 URINALYSIS AUTO W/SCOPE: CPT

## 2020-01-01 PROCEDURE — 1036F TOBACCO NON-USER: CPT | Performed by: TRANSPLANT SURGERY

## 2020-01-01 PROCEDURE — 99284 EMERGENCY DEPT VISIT MOD MDM: CPT

## 2020-01-01 PROCEDURE — 1111F DSCHRG MED/CURRENT MED MERGE: CPT | Performed by: TRANSPLANT SURGERY

## 2020-01-01 PROCEDURE — 6370000000 HC RX 637 (ALT 250 FOR IP): Performed by: INTERNAL MEDICINE

## 2020-01-01 PROCEDURE — 83605 ASSAY OF LACTIC ACID: CPT

## 2020-01-01 PROCEDURE — 80053 COMPREHEN METABOLIC PANEL: CPT

## 2020-01-01 PROCEDURE — 6360000002 HC RX W HCPCS: Performed by: EMERGENCY MEDICINE

## 2020-01-01 PROCEDURE — 82150 ASSAY OF AMYLASE: CPT

## 2020-01-01 PROCEDURE — 72110 X-RAY EXAM L-2 SPINE 4/>VWS: CPT

## 2020-01-01 PROCEDURE — 85018 HEMOGLOBIN: CPT

## 2020-01-01 PROCEDURE — 82105 ALPHA-FETOPROTEIN SERUM: CPT

## 2020-01-01 PROCEDURE — 71260 CT THORAX DX C+: CPT

## 2020-01-01 PROCEDURE — 76705 ECHO EXAM OF ABDOMEN: CPT

## 2020-01-01 PROCEDURE — 6370000000 HC RX 637 (ALT 250 FOR IP): Performed by: STUDENT IN AN ORGANIZED HEALTH CARE EDUCATION/TRAINING PROGRAM

## 2020-01-01 PROCEDURE — 36415 COLL VENOUS BLD VENIPUNCTURE: CPT

## 2020-01-01 PROCEDURE — 82248 BILIRUBIN DIRECT: CPT

## 2020-01-01 PROCEDURE — 99232 SBSQ HOSP IP/OBS MODERATE 35: CPT | Performed by: STUDENT IN AN ORGANIZED HEALTH CARE EDUCATION/TRAINING PROGRAM

## 2020-01-01 PROCEDURE — 47000 NEEDLE BIOPSY OF LIVER PERQ: CPT

## 2020-01-01 PROCEDURE — 71046 X-RAY EXAM CHEST 2 VIEWS: CPT

## 2020-01-01 PROCEDURE — 2580000003 HC RX 258: Performed by: RADIOLOGY

## 2020-01-01 PROCEDURE — 4040F PNEUMOC VAC/ADMIN/RCVD: CPT | Performed by: TRANSPLANT SURGERY

## 2020-01-01 PROCEDURE — 85027 COMPLETE CBC AUTOMATED: CPT

## 2020-01-01 PROCEDURE — 2580000003 HC RX 258: Performed by: INTERNAL MEDICINE

## 2020-01-01 PROCEDURE — 6360000002 HC RX W HCPCS: Performed by: INTERNAL MEDICINE

## 2020-01-01 PROCEDURE — 2060000000 HC ICU INTERMEDIATE R&B

## 2020-01-01 PROCEDURE — 2500000003 HC RX 250 WO HCPCS: Performed by: RADIOLOGY

## 2020-01-01 PROCEDURE — 82140 ASSAY OF AMMONIA: CPT

## 2020-01-01 PROCEDURE — 99232 SBSQ HOSP IP/OBS MODERATE 35: CPT | Performed by: TRANSPLANT SURGERY

## 2020-01-01 PROCEDURE — 96375 TX/PRO/DX INJ NEW DRUG ADDON: CPT

## 2020-01-01 PROCEDURE — 86901 BLOOD TYPING SEROLOGIC RH(D): CPT

## 2020-01-01 PROCEDURE — 83690 ASSAY OF LIPASE: CPT

## 2020-01-01 PROCEDURE — 99223 1ST HOSP IP/OBS HIGH 75: CPT | Performed by: INTERNAL MEDICINE

## 2020-01-01 PROCEDURE — 99222 1ST HOSP IP/OBS MODERATE 55: CPT | Performed by: TRANSPLANT SURGERY

## 2020-01-01 PROCEDURE — 83880 ASSAY OF NATRIURETIC PEPTIDE: CPT

## 2020-01-01 PROCEDURE — 80076 HEPATIC FUNCTION PANEL: CPT

## 2020-01-01 PROCEDURE — 85025 COMPLETE CBC W/AUTO DIFF WBC: CPT

## 2020-01-01 PROCEDURE — 85730 THROMBOPLASTIN TIME PARTIAL: CPT

## 2020-01-01 PROCEDURE — 99222 1ST HOSP IP/OBS MODERATE 55: CPT | Performed by: NURSE PRACTITIONER

## 2020-01-01 PROCEDURE — 85610 PROTHROMBIN TIME: CPT

## 2020-01-01 PROCEDURE — 2580000003 HC RX 258: Performed by: EMERGENCY MEDICINE

## 2020-01-01 PROCEDURE — 86300 IMMUNOASSAY TUMOR CA 15-3: CPT

## 2020-01-01 PROCEDURE — 82378 CARCINOEMBRYONIC ANTIGEN: CPT

## 2020-01-01 PROCEDURE — 99212 OFFICE O/P EST SF 10 MIN: CPT | Performed by: TRANSPLANT SURGERY

## 2020-01-01 PROCEDURE — 0FB03ZX EXCISION OF LIVER, PERCUTANEOUS APPROACH, DIAGNOSTIC: ICD-10-PCS | Performed by: RADIOLOGY

## 2020-01-01 PROCEDURE — 85014 HEMATOCRIT: CPT

## 2020-01-01 PROCEDURE — 72072 X-RAY EXAM THORAC SPINE 3VWS: CPT

## 2020-01-01 PROCEDURE — G8427 DOCREV CUR MEDS BY ELIG CLIN: HCPCS | Performed by: TRANSPLANT SURGERY

## 2020-01-01 PROCEDURE — 84484 ASSAY OF TROPONIN QUANT: CPT

## 2020-01-01 PROCEDURE — 6360000002 HC RX W HCPCS: Performed by: RADIOLOGY

## 2020-01-01 PROCEDURE — 1090F PRES/ABSN URINE INCON ASSESS: CPT | Performed by: TRANSPLANT SURGERY

## 2020-01-01 PROCEDURE — 88341 IMHCHEM/IMCYTCHM EA ADD ANTB: CPT

## 2020-01-01 PROCEDURE — 88307 TISSUE EXAM BY PATHOLOGIST: CPT

## 2020-01-01 PROCEDURE — 88342 IMHCHEM/IMCYTCHM 1ST ANTB: CPT

## 2020-01-01 PROCEDURE — 96365 THER/PROPH/DIAG IV INF INIT: CPT

## 2020-01-01 PROCEDURE — 1123F ACP DISCUSS/DSCN MKR DOCD: CPT | Performed by: TRANSPLANT SURGERY

## 2020-01-01 PROCEDURE — G8400 PT W/DXA NO RESULTS DOC: HCPCS | Performed by: TRANSPLANT SURGERY

## 2020-01-01 PROCEDURE — 96366 THER/PROPH/DIAG IV INF ADDON: CPT

## 2020-01-01 PROCEDURE — 99221 1ST HOSP IP/OBS SF/LOW 40: CPT | Performed by: STUDENT IN AN ORGANIZED HEALTH CARE EDUCATION/TRAINING PROGRAM

## 2020-01-01 PROCEDURE — 2709999900 CT GUIDED NEEDLE PLACEMENT

## 2020-01-01 PROCEDURE — G8420 CALC BMI NORM PARAMETERS: HCPCS | Performed by: TRANSPLANT SURGERY

## 2020-01-01 PROCEDURE — 99283 EMERGENCY DEPT VISIT LOW MDM: CPT

## 2020-01-01 PROCEDURE — 86850 RBC ANTIBODY SCREEN: CPT

## 2020-01-01 PROCEDURE — 86900 BLOOD TYPING SEROLOGIC ABO: CPT

## 2020-01-01 RX ORDER — FENTANYL CITRATE 50 UG/ML
INJECTION, SOLUTION INTRAMUSCULAR; INTRAVENOUS
Status: COMPLETED | OUTPATIENT
Start: 2020-01-01 | End: 2020-01-01

## 2020-01-01 RX ORDER — SODIUM CHLORIDE 0.9 % (FLUSH) 0.9 %
10 SYRINGE (ML) INJECTION PRN
Status: DISCONTINUED | OUTPATIENT
Start: 2020-01-01 | End: 2020-01-01 | Stop reason: HOSPADM

## 2020-01-01 RX ORDER — OXYCODONE HYDROCHLORIDE 5 MG/1
2.5 TABLET ORAL EVERY 4 HOURS PRN
Status: DISCONTINUED | OUTPATIENT
Start: 2020-01-01 | End: 2020-01-01

## 2020-01-01 RX ORDER — HYDROCHLOROTHIAZIDE 25 MG/1
25 TABLET ORAL DAILY
COMMUNITY

## 2020-01-01 RX ORDER — DEXTROSE AND SODIUM CHLORIDE 5; .9 G/100ML; G/100ML
INJECTION, SOLUTION INTRAVENOUS CONTINUOUS
Status: DISCONTINUED | OUTPATIENT
Start: 2020-01-01 | End: 2020-01-01 | Stop reason: HOSPADM

## 2020-01-01 RX ORDER — LIDOCAINE HYDROCHLORIDE 20 MG/ML
INJECTION, SOLUTION INFILTRATION; PERINEURAL
Status: COMPLETED | OUTPATIENT
Start: 2020-01-01 | End: 2020-01-01

## 2020-01-01 RX ORDER — GABAPENTIN 100 MG/1
100 CAPSULE ORAL NIGHTLY
Qty: 14 CAPSULE | Refills: 3 | Status: SHIPPED | OUTPATIENT
Start: 2020-01-01 | End: 2020-01-01 | Stop reason: SDUPTHER

## 2020-01-01 RX ORDER — GABAPENTIN 100 MG/1
100 CAPSULE ORAL NIGHTLY
Status: DISCONTINUED | OUTPATIENT
Start: 2020-01-01 | End: 2020-01-01 | Stop reason: HOSPADM

## 2020-01-01 RX ORDER — 0.9 % SODIUM CHLORIDE 0.9 %
1000 INTRAVENOUS SOLUTION INTRAVENOUS ONCE
Status: COMPLETED | OUTPATIENT
Start: 2020-01-01 | End: 2020-01-01

## 2020-01-01 RX ORDER — TRAMADOL HYDROCHLORIDE 50 MG/1
50 TABLET ORAL EVERY 6 HOURS PRN
Status: DISCONTINUED | OUTPATIENT
Start: 2020-01-01 | End: 2020-01-01

## 2020-01-01 RX ORDER — POTASSIUM CHLORIDE 7.45 MG/ML
10 INJECTION INTRAVENOUS ONCE
Status: COMPLETED | OUTPATIENT
Start: 2020-01-01 | End: 2020-01-01

## 2020-01-01 RX ORDER — GABAPENTIN 100 MG/1
100 CAPSULE ORAL NIGHTLY
Qty: 10 CAPSULE | Refills: 0 | Status: SHIPPED | OUTPATIENT
Start: 2020-01-01 | End: 2020-01-01

## 2020-01-01 RX ORDER — POTASSIUM CHLORIDE 7.45 MG/ML
10 INJECTION INTRAVENOUS
Status: DISPENSED | OUTPATIENT
Start: 2020-01-01 | End: 2020-01-01

## 2020-01-01 RX ORDER — MIDAZOLAM HYDROCHLORIDE 1 MG/ML
INJECTION INTRAMUSCULAR; INTRAVENOUS
Status: COMPLETED | OUTPATIENT
Start: 2020-01-01 | End: 2020-01-01

## 2020-01-01 RX ORDER — OXYCODONE HYDROCHLORIDE 5 MG/1
5 TABLET ORAL EVERY 4 HOURS PRN
Status: DISCONTINUED | OUTPATIENT
Start: 2020-01-01 | End: 2020-01-01 | Stop reason: HOSPADM

## 2020-01-01 RX ORDER — DOCUSATE SODIUM 100 MG/1
200 CAPSULE, LIQUID FILLED ORAL NIGHTLY
Status: DISCONTINUED | OUTPATIENT
Start: 2020-01-01 | End: 2020-01-01 | Stop reason: HOSPADM

## 2020-01-01 RX ORDER — OXYCODONE HYDROCHLORIDE 5 MG/1
5 TABLET ORAL EVERY 6 HOURS PRN
Qty: 40 TABLET | Refills: 0 | Status: SHIPPED | OUTPATIENT
Start: 2020-01-01 | End: 2020-01-01

## 2020-01-01 RX ORDER — SODIUM CHLORIDE 0.9 % (FLUSH) 0.9 %
10 SYRINGE (ML) INJECTION ONCE
Status: COMPLETED | OUTPATIENT
Start: 2020-01-01 | End: 2020-01-01

## 2020-01-01 RX ORDER — FENTANYL CITRATE 50 UG/ML
50 INJECTION, SOLUTION INTRAMUSCULAR; INTRAVENOUS ONCE
Status: COMPLETED | OUTPATIENT
Start: 2020-01-01 | End: 2020-01-01

## 2020-01-01 RX ORDER — OXYCODONE HYDROCHLORIDE 5 MG/1
5 TABLET ORAL EVERY 4 HOURS PRN
Qty: 48 TABLET | Refills: 0 | Status: SHIPPED | OUTPATIENT
Start: 2020-01-01 | End: 2020-01-01 | Stop reason: SDUPTHER

## 2020-01-01 RX ADMIN — IOPAMIDOL 90 ML: 755 INJECTION, SOLUTION INTRAVENOUS at 12:48

## 2020-01-01 RX ADMIN — OXYCODONE 5 MG: 5 TABLET ORAL at 14:21

## 2020-01-01 RX ADMIN — Medication 10 ML: at 12:31

## 2020-01-01 RX ADMIN — OXYCODONE 5 MG: 5 TABLET ORAL at 08:06

## 2020-01-01 RX ADMIN — DEXTROSE AND SODIUM CHLORIDE: 5; 900 INJECTION, SOLUTION INTRAVENOUS at 15:47

## 2020-01-01 RX ADMIN — DEXTROSE AND SODIUM CHLORIDE: 5; 900 INJECTION, SOLUTION INTRAVENOUS at 05:45

## 2020-01-01 RX ADMIN — POTASSIUM CHLORIDE 10 MEQ: 10 INJECTION, SOLUTION INTRAVENOUS at 13:45

## 2020-01-01 RX ADMIN — TRAMADOL HYDROCHLORIDE 50 MG: 50 TABLET, FILM COATED ORAL at 10:35

## 2020-01-01 RX ADMIN — OXYCODONE 5 MG: 5 TABLET ORAL at 20:07

## 2020-01-01 RX ADMIN — TRAMADOL HYDROCHLORIDE 50 MG: 50 TABLET, FILM COATED ORAL at 04:14

## 2020-01-01 RX ADMIN — TRAMADOL HYDROCHLORIDE 50 MG: 50 TABLET, FILM COATED ORAL at 23:16

## 2020-01-01 RX ADMIN — OXYCODONE 5 MG: 5 TABLET ORAL at 08:42

## 2020-01-01 RX ADMIN — FENTANYL CITRATE 50 MCG: 50 INJECTION, SOLUTION INTRAMUSCULAR; INTRAVENOUS at 12:00

## 2020-01-01 RX ADMIN — DEXTROSE AND SODIUM CHLORIDE: 5; 900 INJECTION, SOLUTION INTRAVENOUS at 21:31

## 2020-01-01 RX ADMIN — GABAPENTIN 100 MG: 100 CAPSULE ORAL at 20:07

## 2020-01-01 RX ADMIN — POTASSIUM CHLORIDE 10 MEQ: 10 INJECTION, SOLUTION INTRAVENOUS at 19:31

## 2020-01-01 RX ADMIN — OXYCODONE 5 MG: 5 TABLET ORAL at 19:48

## 2020-01-01 RX ADMIN — TRAMADOL HYDROCHLORIDE 50 MG: 50 TABLET, FILM COATED ORAL at 17:08

## 2020-01-01 RX ADMIN — OXYCODONE 5 MG: 5 TABLET ORAL at 15:22

## 2020-01-01 RX ADMIN — IOPAMIDOL 100 ML: 755 INJECTION, SOLUTION INTRAVENOUS at 12:31

## 2020-01-01 RX ADMIN — MIDAZOLAM 1 MG: 1 INJECTION INTRAMUSCULAR; INTRAVENOUS at 12:00

## 2020-01-01 RX ADMIN — DEXTROSE AND SODIUM CHLORIDE: 5; 900 INJECTION, SOLUTION INTRAVENOUS at 01:33

## 2020-01-01 RX ADMIN — SODIUM CHLORIDE 1000 ML: 9 INJECTION, SOLUTION INTRAVENOUS at 13:43

## 2020-01-01 RX ADMIN — DEXTROSE AND SODIUM CHLORIDE: 5; 900 INJECTION, SOLUTION INTRAVENOUS at 23:17

## 2020-01-01 RX ADMIN — DOCUSATE SODIUM 200 MG: 100 CAPSULE, LIQUID FILLED ORAL at 20:27

## 2020-01-01 RX ADMIN — DEXTROSE AND SODIUM CHLORIDE: 5; 900 INJECTION, SOLUTION INTRAVENOUS at 10:12

## 2020-01-01 RX ADMIN — SODIUM CHLORIDE, PRESERVATIVE FREE 10 ML: 5 INJECTION INTRAVENOUS at 16:27

## 2020-01-01 RX ADMIN — TRAMADOL HYDROCHLORIDE 50 MG: 50 TABLET, FILM COATED ORAL at 21:29

## 2020-01-01 RX ADMIN — DOCUSATE SODIUM 200 MG: 100 CAPSULE, LIQUID FILLED ORAL at 21:29

## 2020-01-01 RX ADMIN — POTASSIUM BICARBONATE 40 MEQ: 782 TABLET, EFFERVESCENT ORAL at 16:30

## 2020-01-01 RX ADMIN — FENTANYL CITRATE 50 MCG: 50 INJECTION, SOLUTION INTRAMUSCULAR; INTRAVENOUS at 13:46

## 2020-01-01 RX ADMIN — GABAPENTIN 100 MG: 100 CAPSULE ORAL at 20:27

## 2020-01-01 RX ADMIN — IOHEXOL 50 ML: 240 INJECTION, SOLUTION INTRATHECAL; INTRAVASCULAR; INTRAVENOUS; ORAL at 12:31

## 2020-01-01 RX ADMIN — IOPAMIDOL 110 ML: 755 INJECTION, SOLUTION INTRAVENOUS at 16:45

## 2020-01-01 RX ADMIN — Medication 1 KIT: at 12:20

## 2020-01-01 RX ADMIN — LIDOCAINE HYDROCHLORIDE 11 ML: 20 INJECTION, SOLUTION INFILTRATION; PERINEURAL at 12:15

## 2020-01-01 RX ADMIN — POTASSIUM CHLORIDE 10 MEQ: 10 INJECTION, SOLUTION INTRAVENOUS at 16:27

## 2020-01-01 RX ADMIN — POTASSIUM CHLORIDE 10 MEQ: 10 INJECTION, SOLUTION INTRAVENOUS at 18:00

## 2020-01-01 RX ADMIN — DOCUSATE SODIUM 200 MG: 100 CAPSULE, LIQUID FILLED ORAL at 20:07

## 2020-01-01 ASSESSMENT — PAIN DESCRIPTION - LOCATION
LOCATION: BACK;ABDOMEN
LOCATION: BACK;ABDOMEN
LOCATION: ABDOMEN;BACK
LOCATION: BACK;ABDOMEN
LOCATION: ABDOMEN;BACK
LOCATION: ABDOMEN;BACK
LOCATION: ABDOMEN
LOCATION: ABDOMEN;BACK
LOCATION: BACK;ABDOMEN

## 2020-01-01 ASSESSMENT — PAIN DESCRIPTION - FREQUENCY
FREQUENCY: INTERMITTENT
FREQUENCY: CONTINUOUS
FREQUENCY: INTERMITTENT
FREQUENCY: INTERMITTENT
FREQUENCY: CONTINUOUS
FREQUENCY: CONTINUOUS
FREQUENCY: INTERMITTENT

## 2020-01-01 ASSESSMENT — PAIN DESCRIPTION - DESCRIPTORS
DESCRIPTORS: ACHING;CONSTANT;DISCOMFORT
DESCRIPTORS: ACHING;DISCOMFORT
DESCRIPTORS: ACHING;DISCOMFORT;DULL
DESCRIPTORS: ACHING;CONSTANT;DISCOMFORT
DESCRIPTORS: ACHING;DISCOMFORT;DULL
DESCRIPTORS: ACHING
DESCRIPTORS: ACHING;DISCOMFORT
DESCRIPTORS: ACHING;DISCOMFORT
DESCRIPTORS: ACHING;CONSTANT;DISCOMFORT

## 2020-01-01 ASSESSMENT — PAIN DESCRIPTION - ONSET
ONSET: GRADUAL

## 2020-01-01 ASSESSMENT — PAIN SCALES - GENERAL
PAINLEVEL_OUTOF10: 0
PAINLEVEL_OUTOF10: 1
PAINLEVEL_OUTOF10: 0
PAINLEVEL_OUTOF10: 4
PAINLEVEL_OUTOF10: 8
PAINLEVEL_OUTOF10: 0
PAINLEVEL_OUTOF10: 8
PAINLEVEL_OUTOF10: 5
PAINLEVEL_OUTOF10: 1
PAINLEVEL_OUTOF10: 5
PAINLEVEL_OUTOF10: 8
PAINLEVEL_OUTOF10: 0
PAINLEVEL_OUTOF10: 9
PAINLEVEL_OUTOF10: 2
PAINLEVEL_OUTOF10: 0
PAINLEVEL_OUTOF10: 3
PAINLEVEL_OUTOF10: 6
PAINLEVEL_OUTOF10: 2
PAINLEVEL_OUTOF10: 7
PAINLEVEL_OUTOF10: 3
PAINLEVEL_OUTOF10: 8
PAINLEVEL_OUTOF10: 6
PAINLEVEL_OUTOF10: 3
PAINLEVEL_OUTOF10: 0
PAINLEVEL_OUTOF10: 7
PAINLEVEL_OUTOF10: 0
PAINLEVEL_OUTOF10: 2
PAINLEVEL_OUTOF10: 0
PAINLEVEL_OUTOF10: 0
PAINLEVEL_OUTOF10: 2
PAINLEVEL_OUTOF10: 9
PAINLEVEL_OUTOF10: 0
PAINLEVEL_OUTOF10: 3
PAINLEVEL_OUTOF10: 4

## 2020-01-01 ASSESSMENT — PAIN DESCRIPTION - ORIENTATION
ORIENTATION: RIGHT;LOWER
ORIENTATION: RIGHT;LEFT
ORIENTATION: RIGHT;LOWER

## 2020-01-01 ASSESSMENT — PAIN DESCRIPTION - PAIN TYPE
TYPE: ACUTE PAIN
TYPE: CHRONIC PAIN
TYPE: ACUTE PAIN
TYPE: ACUTE PAIN
TYPE: CHRONIC PAIN
TYPE: ACUTE PAIN
TYPE: CHRONIC PAIN

## 2020-01-01 ASSESSMENT — PAIN DESCRIPTION - PROGRESSION
CLINICAL_PROGRESSION: NOT CHANGED

## 2020-01-01 ASSESSMENT — PAIN - FUNCTIONAL ASSESSMENT
PAIN_FUNCTIONAL_ASSESSMENT: PREVENTS OR INTERFERES SOME ACTIVE ACTIVITIES AND ADLS

## 2020-01-01 ASSESSMENT — ENCOUNTER SYMPTOMS: BACK PAIN: 1

## 2020-08-24 PROBLEM — R17 JAUNDICE: Status: ACTIVE | Noted: 2020-01-01

## 2020-08-24 NOTE — ED NOTES
FIRST PROVIDER CONTACT ASSESSMENT NOTE      Department of Emergency Medicine   ED  First Provider Note   8/24/20  11:51 AM EDT    Chief Complaint: Back Pain (sent by Dr Alondra Chery for multiple complaints pt states she is having back pain that radiates around her waist pt appears jaundiced)      History of Present Illness:    Yared Bolanos is a 78 y.o. female who presents to the ED by private car for evaluation. Patient has a history of colon cancer as well as breast cancer. Patient in March had a CT scan and was found to have large amount metastases to the liver. I do not see any follow-up after that in the patient's chart. Patient did see her family doctor today she is having a large amount of abdominal pain that is radiating around her waist to her back. Patient appears to be jaundiced.       Focused Screening Exam:  Constitutional:  Alert, appears stated age patient is jaundiced in color    *ALLERGIES*     Morphine and Reglan [metoclopramide]     ED Triage Vitals [08/24/20 1111]   BP Temp Temp src Pulse Resp SpO2 Height Weight   -- 98.7 °F (37.1 °C) -- 92 -- 95 % -- --        Initial Plan of Care:  Initiate Treatment-Testing, Proceed toTreatment Area When Bed Available for ED Attending/MLP to Continue Care    -----------------END OF FIRST PROVIDER CONTACT ASSESSMENT NOTE--------------  Electronically signed by Mckenna Hammonds PA-C   DD: 8/24/20       Mckenna Hammonds PA-C  08/24/20 0989

## 2020-08-24 NOTE — ED PROVIDER NOTES
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 On presentation to the emergency department patient was noted to be jaundiced. She complained of fatigue and weakness like never before. Patient did not complain of any pain. She did not have lower extremity. Patient was awake alert oriented but fatigued. Review of Systems   Constitutional: Positive for activity change and fatigue. All other systems reviewed and are negative. Physical Exam  Vitals signs and nursing note reviewed. Constitutional:       Appearance: She is ill-appearing. HENT:      Head: Normocephalic and atraumatic. Eyes:      General: Scleral icterus present. Cardiovascular:      Rate and Rhythm: Normal rate and regular rhythm. Pulses: Normal pulses. Heart sounds: Normal heart sounds. Pulmonary:      Effort: Pulmonary effort is normal.      Breath sounds: Normal breath sounds. Abdominal:      Palpations: There is mass (R sided). Skin:     General: Skin is warm and dry. Capillary Refill: Capillary refill takes less than 2 seconds. Coloration: Skin is jaundiced. Neurological:      Mental Status: She is alert. Motor: Weakness present. Procedures     MDM  Number of Diagnoses or Management Options  General weakness:   Jaundice, non-:   Diagnosis management comments: Patient was noted to have a potassium of 2.5 which was replaced. Amount and/or Complexity of Data Reviewed  Clinical lab tests: reviewed              --------------------------------------------- PAST HISTORY ---------------------------------------------  Past Medical History:  has a past medical history of Breast cancer ([facility] [address] Hyperlipidemia, Hypertension, Kidney stone, and Water retention.     Past Surgical History:  has a past surgical history that includes Hysterectomy; eye surgery (Bilateral); back surgery (1996); Breast lumpectomy (Left, 2008); and Cystoscopy (6/18/2015). Social History:  reports that she has never smoked. She has never used smokeless tobacco. She reports current alcohol use. She reports that she does not use drugs. Family History: family history is not on file. The patients home medications have been reviewed.     Allergies: Morphine and Reglan [metoclopramide]    -------------------------------------------------- RESULTS -------------------------------------------------    LABS:  Results for orders placed or performed during the hospital encounter of 08/24/20   Comprehensive Metabolic Panel   Result Value Ref Range    Sodium 133 132 - 146 mmol/L    Potassium 2.5 (LL) 3.5 - 5.0 mmol/L    Chloride 84 (L) 98 - 107 mmol/L    CO2 31 (H) 22 - 29 mmol/L    Anion Gap 18 (H) 7 - 16 mmol/L    Glucose 84 74 - 99 mg/dL    BUN 22 8 - 23 mg/dL    CREATININE 0.7 0.5 - 1.0 mg/dL    GFR Non-African American >60 >=60 mL/min/1.73    GFR African American >60     Calcium 10.3 (H) 8.6 - 10.2 mg/dL    Total Protein 6.3 (L) 6.4 - 8.3 g/dL    Alb 3.2 (L) 3.5 - 5.2 g/dL    Total Bilirubin 10.8 (H) 0.0 - 1.2 mg/dL    Alkaline Phosphatase 771 (H) 35 - 104 U/L    ALT 99 (H) 0 - 32 U/L     (H) 0 - 31 U/L   CBC Auto Differential   Result Value Ref Range    WBC 12.6 (H) 4.5 - 11.5 E9/L    RBC 4.09 3.50 - 5.50 E12/L    Hemoglobin 12.8 11.5 - 15.5 g/dL    Hematocrit 38.5 34.0 - 48.0 %    MCV 94.1 80.0 - 99.9 fL    MCH 31.3 26.0 - 35.0 pg    MCHC 33.2 32.0 - 34.5 %    RDW 22.8 (H) 11.5 - 15.0 fL    Platelets 263 055 - 447 E9/L    MPV 11.5 7.0 - 12.0 fL    Neutrophils % 81.5 (H) 43.0 - 80.0 %    Immature Granulocytes % 1.3 0.0 - 5.0 %    Lymphocytes % 7.3 (L) 20.0 - 42.0 %    Monocytes % 9.2 2.0 - 12.0 %    Eosinophils % 0.4 0.0 - 6.0 %    Basophils % 0.3 0.0 - 2.0 %    Neutrophils Absolute 10.30 (H) 1.80 - 7.30 E9/L    Immature Granulocytes # 0.17 E9/L    Lymphocytes Absolute 0.92 (L) 1.50 - 4.00 E9/L    Monocytes Absolute 1.16 (H) 0.10 - 0.95 E9/L    Eosinophils Absolute 0.05 0.05 - 0.50 E9/L    Basophils Absolute 0.04 0.00 - 0.20 E9/L    Anisocytosis 3+     Polychromasia 2+     Poikilocytes 2+     Schistocytes 1+     Ovalocytes 2+     Target Cells 1+    Lipase   Result Value Ref Range    Lipase 24 13 - 60 U/L   Troponin   Result Value Ref Range    Troponin <0.01 0.00 - 0.03 ng/mL   Amylase   Result Value Ref Range    Amylase 34 20 - 100 U/L   Lactic Acid, Plasma   Result Value Ref Range    Lactic Acid 3.2 (H) 0.5 - 2.2 mmol/L   Urinalysis with Microscopic   Result Value Ref Range    Color, UA Yellow Straw/Yellow    Clarity, UA Clear Clear    Glucose, Ur Negative Negative mg/dL    Bilirubin Urine LARGE (A) Negative    Ketones, Urine Negative Negative mg/dL    Specific Gravity, UA <=1.005 1.005 - 1.030    Blood, Urine MODERATE (A) Negative    pH, UA 7.5 5.0 - 9.0    Protein, UA Negative Negative mg/dL    Urobilinogen, Urine >=8.0 (A) <2.0 E.U./dL    Nitrite, Urine Negative Negative    Leukocyte Esterase, Urine MODERATE (A) Negative   Protime-INR   Result Value Ref Range    Protime 16.5 (H) 9.3 - 12.4 sec    INR 1.5    Ammonia   Result Value Ref Range    Ammonia 35.0 11.0 - 51.0 umol/L   Brain Natriuretic Peptide   Result Value Ref Range    Pro- (H) 0 - 450 pg/mL   TYPE AND SCREEN   Result Value Ref Range    ABO/Rh B POS     Antibody Screen NEG        RADIOLOGY:  CT ABDOMEN PELVIS W IV CONTRAST Additional Contrast? None   Final Result   Severe progression of metastatic disease to the liver with   mild ascites as above commented. XR CHEST (2 VW)   Final Result      No airspace opacities or pleural effusion. US GALLBLADDER RUQ   Final Result   The gallbladder appears to be contracted, the wall is   irregular and thickened likely due to contraction.  Repeat study when   the patient is nothing per oral would be helpful             ------------------------- NURSING NOTES AND VITALS

## 2020-08-25 NOTE — CONSULTS
cortical thickness. Right kidney is inferiorly displaced by the enlargement of the liver. Bladder has unremarkable appearance. Patient had previous hysterectomy. Cannot see conspicuously ovaries. There is some degree of edema of the small bowel segments with some discrete stranding of the omental mesenteric fat planes but there is ascites which more likely be on bases of metatarsals considering the history of known malignancy. Patient previous appendectomy. There is no indication for bowel obstruction. Lower lung bases demonstrate no significant findings. There is a fluid distention of the esophagus. Visualized bone structures have unremarkable appearance. The     Severe progression of metastatic disease to the liver with mild ascites as above commented. Us Gallbladder Ruq    Result Date: 2020  Patient MRN:  [id number] : 1940 Age: 78 years Gender: Female Order Date:  2020 2:23 PM EXAM: US GALLBLADDER RUQ NUMBER OF IMAGES:  14 INDICATION:  new jaundice, hyperbilirubinemia new jaundice, hyperbilirubinemia COMPARISON: None The common duct is within normal limits. The gallbladder is contracted. This limits evaluation. This demonstrates physiologic thickening due to contraction. Calculi are not identified. Sludge is not identified. The gallbladder appears to be contracted, the wall is irregular and thickened likely due to contraction. Repeat study when the patient is nothing per oral would be helpful         ASSESSMENT:  78 y.o. female with diffuse metastatic disease throughout the liver    PLAN:  - Agree liver biopsy  - CT chest for further metastasis  - Will fractionate bilirubin  - Agree palliative consult  - pain/nausea control per primary  - discussed the above with Ms. Sanon - majority of her liver is replaced with tumor    Electronically signed by Jose Styles MD on 2020 at 7:55 AM

## 2020-08-25 NOTE — CONSULTS
\"not myself\"    Reglan [Metoclopramide] Other (See Comments)     Anxiety, diaphoresis       Physical Exam:  BP (!) 109/55   Pulse 88   Temp 97.6 °F (36.4 °C) (Temporal)   Resp 18   Ht 5' 2\" (1.575 m)   Wt 115 lb (52.2 kg)   SpO2 96%   BMI 21.03 kg/m²   GENERAL: Alert, oriented x 3, not in acute distress. she is jaundiced. HEENT: PERRLA; EOMI. Oropharynx clear. Icteric sclerae. NECK: Supple. Without lymphadenopathy. LUNGS: Good air entry bilaterally. No wheezing, crackles or ronchi. CARDIOVASCULAR: Regular rate. No murmurs, rubs or gallops. ABDOMEN: Soft. Tenderness in the right upper quadrant, non-distended. Positive bowel sounds. EXTREMITIES: Without clubbing, cyanosis, or edema. NEUROLOGIC: No focal deficits. ECOG PS 1    Recent Labs     08/24/20  1215 08/25/20  0537   WBC 12.6* 11.0   RBC 4.09 3.76   HGB 12.8 11.8   HCT 38.5 35.9   MCV 94.1 95.5   MCH 31.3 31.4   MCHC 33.2 32.9   RDW 22.8* 23.5*    172   MPV 11.5 11.7        Recent Labs     08/24/20  1215 08/25/20  0537    133   K 2.5* 4.0   CL 84* 93*   CO2 31* 26   BUN 22 16   CREATININE 0.7 0.6   GLUCOSE 84 77   CALCIUM 10.3* 9.2   PROT 6.3* 5.6*   LABALBU 3.2* 2.6*   BILITOT 10.8* 9.9*   ALKPHOS 771* 658*   * 268*   ALT 99* 74*        Impression/Plan:  78 y.o. female with a past medical hx of kidney stone, HTN, HLD, and left breast cancer in 2008. S/p left breast lumpectomy and axillary node dissection. Stage 1 pT1b pN0 M0. ER + CO + Her2 negative. She received radiation treatments and completed 5 years of hormonal therapy with Arimidex. 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 She has been being seen and treated by [facility] with Dr. Rex Anguiano.  She was last seen in April 2020 and was recommended to have CT guided liver biopsy due to imaging revealing multifocal liver involvement, the patient did not follow-up as recommended and did not have the biopsy done. Admitted to ED on 08/24/2020 with general weakness and jaundice. On 08/24/2020: AlkPhos 771, ALT 99, , total bili 10.8    CXR 08/24/2020: No airspace opacities or pleural effusion. CT abdomen/pelvis 08/24/2020: Severe progression of metastatic disease to the liver with mild ascites as above commented. Patient with metastatic disease to the liver, could be a recurrence of the breast cancer, or anal cancer or another primary. We agree with Dr. Manjit Chauhan and the [facility] on obtaining a CT guided liver biopsy to confirm pathology. CEA ordered by [facility] and pending. Will order CT guided biopsy and consult [facility] team (Dr. Emelina Stokes).      -Tumor markers.  -Transfuse with pRBCs if Hb <7g/dL  -CT guided liver biopsy ordered to confirm pathology   -Chest Ct scan.  -Consult [facility] team (Dr. Emelina Stokes) for liver lesions  -Consult Palliative Medicine.  -Continue following with [facility]    Thank you for allowing us to participate in the care of EDMUND [address] [place] [address]  August 25, 2020       I have reviewed the chart and seen the patient, I personally performed key elements of the history and exam, I reviewed the medications, laboratory data, imaging studies, I agree with the assessment and plan as outlined.     Nieyc Ayala MD   HEMATOLOGY/MEDICAL [address] [facility],  [id number] [facility] [address] [facility] [address]  Dept: [phone]

## 2020-08-25 NOTE — PROGRESS NOTES
Paged Dr. Franklin Tse regarding patient requesting a consult with Dr. Marivel Buckley for a second opinion.

## 2020-08-25 NOTE — CARE COORDINATION
Met with the patient at the bedside to discuss transition of care planning. Patient lives in a ranch style home with three step entry with her domestic partner Carlos Su. Patient has no DME but is currently doing outpatient therapy for pain in her back. Patient's PCP is Dr Otilia Joiner and she uses [facility] in [address] [place] [address] for her medications. Explained that Oncology would be seeing her. Patient is requesting consultation with [facility] rather than the [facility] in [facility]. Charge nurse notified of the patient's request.  Await input and work-up from Oncology. Will continue to follow.      Jeri Cerda.  P:  [phone]

## 2020-08-25 NOTE — PLAN OF CARE
Problem: Falls - Risk of:  Goal: Will remain free from falls  Description: Will remain free from falls  8/25/2020 0038 by Marylou Lindo RN  Outcome: Met This Shift  8/25/2020 0038 by Marylou Lindo RN  Outcome: Met This Shift  Goal: Absence of physical injury  Description: Absence of physical injury  8/25/2020 0038 by Marylou Lindo RN  Outcome: Met This Shift  8/25/2020 0038 by Marylou Lindo RN  Outcome: Met This Shift     Problem: Pain:  Goal: Pain level will decrease  Description: Pain level will decrease  8/25/2020 0038 by Marylou Lindo RN  Outcome: Met This Shift  8/25/2020 0038 by Marylou Lindo RN  Outcome: Met This Shift  Goal: Control of acute pain  Description: Control of acute pain  8/25/2020 0038 by Marylou Lindo RN  Outcome: Met This Shift  8/25/2020 0038 by Marylou Lindo RN  Outcome: Met This Shift  Goal: Control of chronic pain  Description: Control of chronic pain  8/25/2020 0038 by Marylou Lindo RN  Outcome: Met This Shift  8/25/2020 0038 by Marylou Lindo RN  Outcome: Met This Shift

## 2020-08-25 NOTE — CONSULTS
Eddy and Francesla Donate  Dr. Bar Bocanegra      Patient Name: Yessica Dowd  YOB: 1940  PCP: Erin Borrego MD   Referring Provider:      Reason for Consultation:   Chief Complaint   Patient presents with    Back Pain     sent by Dr Ileana Soliz for multiple complaints pt states she is having back pain that radiates around her waist pt appears jaundiced        History of Present Illness: This pt is a pleasant 79 yo female who follows with my partner Dr. Devyn Basilio for a history of invasive ductal carcinoma of the left breast status post left lumpectomy and axillary node dissection in September 2009. She had stage I disease pT1b pN0 M0 with positive ER and NH receptors and negative HER2/scott. She completed local adjuvant radiation to the left breast as well as fiveyear course of adjuvant hormonal therapy with Anastrozole. 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 She last saw Dr. Devyn Basilio in 4/20, and at that time had a rising CEA and transaminiases with imaging showing multifocal liver involvement and was recommended CT guided liver biopsy. She did not keep her follow up appointment in 5/20. She now presents to the ER with fatigue and exhaustion after visit to her PCP. She is jaundiced, with alk phos of 658, elevated transaminiases and a T bili of 9.9.  CT scan of the A/P shows severe progression of the metastatic disease in the liver with mild ascites    Diagnostic Data:     Past Medical History:   Diagnosis Date    Breast cancer ([facility] [address] 2008    Hyperlipidemia     Hypertension     Kidney stone     Water retention        Patient Active Problem List    Diagnosis Date Noted    Jaundice 08/24/2020    Squamous cell cancer, anus ([facility] [address] 11/21/2019        Past Surgical History:   Procedure Laterality Date    BACK SURGERY  1996    BREAST LUMPECTOMY Left 2008    CYSTOSCOPY  6/18/2015    cysto, retro, pyelogram, laser lithotripsy, stent insertion No focal motor or sensory deficits . Recent Laboratory Data-   Lab Results   Component Value Date    WBC 11.0 08/25/2020    HGB 11.8 08/25/2020    HCT 35.9 08/25/2020    MCV 95.5 08/25/2020     08/25/2020    LYMPHOPCT 7.3 (L) 08/24/2020    RBC 3.76 08/25/2020    MCH 31.4 08/25/2020    MCHC 32.9 08/25/2020    RDW 23.5 (H) 08/25/2020    NEUTOPHILPCT 81.5 (H) 08/24/2020    MONOPCT 9.2 08/24/2020    BASOPCT 0.3 08/24/2020    NEUTROABS 10.30 (H) 08/24/2020    LYMPHSABS 0.92 (L) 08/24/2020    MONOSABS 1.16 (H) 08/24/2020    EOSABS 0.05 08/24/2020    BASOSABS 0.04 08/24/2020       Lab Results   Component Value Date     08/25/2020    K 4.0 08/25/2020    CL 93 (L) 08/25/2020    CO2 26 08/25/2020    BUN 16 08/25/2020    CREATININE 0.6 08/25/2020    GLUCOSE 77 08/25/2020    CALCIUM 9.2 08/25/2020    PROT 5.6 (L) 08/25/2020    LABALBU 2.6 (L) 08/25/2020    BILITOT 9.9 (H) 08/25/2020    ALKPHOS 658 (H) 08/25/2020     (H) 08/25/2020    ALT 74 (H) 08/25/2020    LABGLOM >60 08/25/2020    GFRAA >60 08/25/2020       No results found for: IRON, TIBC, FERRITIN        Radiology-    CT ABDOMEN PELVIS W IV CONTRAST Additional Contrast? None   Final Result   Severe progression of metastatic disease to the liver with   mild ascites as above commented. XR CHEST (2 VW)   Final Result      No airspace opacities or pleural effusion. US GALLBLADDER RUQ   Final Result   The gallbladder appears to be contracted, the wall is   irregular and thickened likely due to contraction. Repeat study when   the patient is nothing per oral would be helpful               ASSESSMENT/PLAN :  79 yo female  IDC of the breast, s/p resection, adj XRT and AI therapy  Anal cancer s/p CRT with 5FU and MMC  Progressive liver mets  Transaminitis  Hyperbilirubinemia    - Check CEA  - Recommend CT guided biopsy of the liver lesion to confirm pathology  - Patient requesting second opinion from [facility] oncology.  We will be happy to continue following if she wishes to remain with our group, or provide any necessary records should she wish to transition her care to Dr. Marnie English  - Please call with any questions or concerns      Electronically signed by Wilbert Montanez MD on 8/25/2020 at 3:53 PM

## 2020-08-25 NOTE — H&P
1-2 mixed drinks on weekend        Review of Systems:  Respiratory: negative for cough and hemoptysis  Cardiovascular: negative for chest pain and dyspnea  Gastrointestinal: negative for abdominal pain, diarrhea, nausea and vomiting  Genitourinary:negative for dysuria and hematuria  Derm: negative for rash and skin lesion(s)  Neurological: negative for seizures and tremors  Endocrine: negative for diabetic symptoms including polydipsia and polyuria    Physical Exam:  Vitals:    08/24/20 2235   BP: 126/60   Pulse: 78   Resp: 16   Temp: 97.2 °F (36.2 °C)   SpO2: 100%      Skin:  Warm and dry. No rash or bruises, jaundice   HEENT:  PERRLA, EOMI  Neck:  No JVD, No thyromegaly, No carotid bruit  Cardiac:  RRR, No gallop or murmur  Lungs:  CTA, Normal percussion  Abdomen: Normal bowel sounds,  non-tender,enlarged hard liver   Extremities:  No clubbing, edema or cyanosis  Neurological:  Moves all extremities,.     Labs:    CBC with Differential:    Lab Results   Component Value Date    WBC 11.0 08/25/2020    RBC 3.76 08/25/2020    HGB 11.8 08/25/2020    HCT 35.9 08/25/2020     08/25/2020    MCV 95.5 08/25/2020    MCH 31.4 08/25/2020    MCHC 32.9 08/25/2020    RDW 23.5 08/25/2020    LYMPHOPCT 7.3 08/24/2020    MONOPCT 9.2 08/24/2020    BASOPCT 0.3 08/24/2020    MONOSABS 1.16 08/24/2020    LYMPHSABS 0.92 08/24/2020    EOSABS 0.05 08/24/2020    BASOSABS 0.04 08/24/2020     CMP:    Lab Results   Component Value Date     08/25/2020    K 4.0 08/25/2020    CL 93 08/25/2020    CO2 26 08/25/2020    BUN 16 08/25/2020    CREATININE 0.6 08/25/2020    GFRAA >60 08/25/2020    LABGLOM >60 08/25/2020    GLUCOSE 77 08/25/2020    PROT 5.6 08/25/2020    LABALBU 2.6 08/25/2020    CALCIUM 9.2 08/25/2020    BILITOT 9.9 08/25/2020    ALKPHOS 658 08/25/2020     08/25/2020    ALT 74 08/25/2020          Assessment and Plan:    Patient Active Problem List   Diagnosis    Squamous cell cancer, anus ([facility] [address] metastatic to liver     Jaundice  Breast ca   hypokalemia

## 2020-08-26 NOTE — PROGRESS NOTES
Inpatient Medical Oncology Progress Note    Subjective:  Just had liver biopsy. No fever. Objective:  /77   Pulse 84   Temp 98 °F (36.7 °C) (Temporal)   Resp 14   Ht 5' 2\" (1.575 m)   Wt 115 lb (52.2 kg)   SpO2 99%   BMI 21.03 kg/m²   GENERAL: Alert, oriented x 3, not in acute distress. she is jaundiced. HEENT: PERRLA; EOMI. Oropharynx clear. Icteric sclerae. NECK: Supple. Without lymphadenopathy. LUNGS: Good air entry bilaterally. No wheezing, crackles or ronchi. CARDIOVASCULAR: Regular rate. No murmurs, rubs or gallops. ABDOMEN: Soft. Tenderness RUQ. Non-distended. Positive bowel sounds. EXTREMITIES: Without clubbing, cyanosis, or edema. NEUROLOGIC: No focal deficits. ECOG PS 1    Diagnostics:  Lab Results   Component Value Date    WBC 11.0 08/25/2020    HGB 11.8 08/25/2020    HCT 35.9 08/25/2020    MCV 95.5 08/25/2020     08/25/2020     Lab Results   Component Value Date     08/25/2020    K 4.0 08/25/2020    CL 93 (L) 08/25/2020    CO2 26 08/25/2020    BUN 16 08/25/2020    CREATININE 0.6 08/25/2020    GLUCOSE 77 08/25/2020    CALCIUM 9.2 08/25/2020    PROT 5.5 (L) 08/26/2020    LABALBU 2.8 (L) 08/26/2020    BILITOT 9.6 (H) 08/26/2020    ALKPHOS 668 (H) 08/26/2020     (H) 08/26/2020    ALT 74 (H) 08/26/2020    LABGLOM >60 08/25/2020    GFRAA >60 08/25/2020     Impression/Plan:  78 y.o. female with hx of left breast cancer in 2008. S/p left breast lumpectomy and axillary node dissection. Stage 1 pT1b pN0 M0. ER + SC + Her2 negative. She received radiation treatments and completed 5 years of hormonal therapy with Arimidex. 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 She has been being seen and treated by [facility] with Dr. Vikash Dawn.  She was last seen in April 2020 and was recommended to have CT guided liver biopsy due to imaging revealing multifocal liver involvement, the patient did not follow-up as recommended and did not have the biopsy done.      Admitted to ED on 08/24/2020 with general weakness and jaundice. On 08/24/2020: AlkPhos 771, ALT 99, , total bili 10.8  CT abdomen/pelvis 08/24/2020: Severe progression of metastatic disease to the liver with mild ascites as above commented.      Patient with metastatic disease to the liver, could be a recurrence of the breast cancer, or anal cancer or another primary. CT guided liver biopsy today 08/26/2020   CT chest pending  .5   pending  AFP pending  Hepatobiliary team on board.   Palliative team for sx management  She will continue to follow with [facility]    08/26/2020  Derek Valdez MD

## 2020-08-26 NOTE — PROGRESS NOTES
This is a 49-year-old female patient coming down to IR for image guided liver biopsy related to the diagnosis of liver lesions as ordered by Dr Sofia Keller and EDMUND Michelle. The patient's code status is full. Medical history, labs, and medications have been reviewed. Allergies were reviewed, including contrast, latex, and iodine. Morphine and reglan are listed. The patient has not been on any thinners. pregnancy status - N/A  The patient is not on oxygen; No antibiotics ordered    The floor nurse was called for report and I spoke with Jill. Per SILVINA Melchor, the patient has been NPO since midnight, can provide their own consent, and can lay flat in the supine position for the procedure. Informed her that a new PT/INR lab order was placed and needs drawn as soon as possible. ID Band Check: Yes    ALLERGIES: Morphine and Reglan [metoclopramide]    LABS:   Lab Results   Component Value Date    INR 1.5 08/24/2020    PROTIME 16.5 (H) 08/24/2020           Lab Results   Component Value Date    CREATININE 0.6 08/25/2020    BUN 16 08/25/2020          Lab Results   Component Value Date    HGB 11.8 08/25/2020    HCT 35.9 08/25/2020     08/25/2020       The patient has 18 gauge IV placed for access in the right AC. Orders were placed as necessary.

## 2020-08-26 NOTE — BRIEF OP NOTE
Brief Postoperative Note    Emiliano Opitz  YOB: 1940  [id number]    Pre-operative Diagnosis and Procedure: 79 yo F with a history of breast and anal cancer now with multiple livers throughout the liver, concerning for metastatic disease. Post-operative Diagnosis: Same    Anesthesia: Local    Estimated Blood Loss: < 10 cc    Surgeon: Kurt Harkins MD    Complications: none    Specimen obtained:  8 cores    Findings: Successful CT guided biopsy of a segment 6 liver lesion.      Kurt Harkins MD   8/26/2020 2:10 PM

## 2020-08-26 NOTE — PROGRESS NOTES
1120Patient arrived via cart with transport    to Radiology department for image guided liver biopsy            . Allergies, home medications, H&P and fasting instructions reviewed with patient. Vital signs taken. Procedural instructions given, questions answered, understanding expressed and consent signed. Patient given fluoroscopy education, no questions at this time. 1225 biopsy completed. Awaiting ct scan. 1230 report called to nia barrera   [id number] ct scan completed  1325 returned to floor with transporter. 1330 specimen to lab.  # [id number]

## 2020-08-26 NOTE — PROGRESS NOTES
Admit Date: 8/24/2020    Subjective:     Feels fine ,all consults appreciated     Objective:     Patient Vitals for the past 8 hrs:   BP Temp Temp src Pulse Resp SpO2   08/26/20 0005 119/63 97.8 °F (36.6 °C) Temporal 94 18 96 %     I/O last 3 completed shifts: In: 1712.4 [P.O.:360; I.V.:1352.4]  Out: -   No intake/output data recorded. HEENT: Normal  NECK: Thyroid normal. No carotid bruit. No lymphphadenopathy. CVS: RRR  RS: Clear. No wheeze. No rhonchi. Good airflow bilaterally. ABD: Soft. Non tender. No mass. Normal BS.hepatomegaly   EXT: No edema. Non tender. Pulses present. Skin intact.   NEURO: Intact      Scheduled Meds:   docusate sodium  200 mg Oral Nightly     Continuous Infusions:   dextrose 5 % and 0.9 % NaCl 75 mL/hr at 08/25/20 2317       CBC with Differential:    Lab Results   Component Value Date    WBC 11.0 08/25/2020    RBC 3.76 08/25/2020    HGB 11.8 08/25/2020    HCT 35.9 08/25/2020     08/25/2020    MCV 95.5 08/25/2020    MCH 31.4 08/25/2020    MCHC 32.9 08/25/2020    RDW 23.5 08/25/2020    LYMPHOPCT 7.3 08/24/2020    MONOPCT 9.2 08/24/2020    BASOPCT 0.3 08/24/2020    MONOSABS 1.16 08/24/2020    LYMPHSABS 0.92 08/24/2020    EOSABS 0.05 08/24/2020    BASOSABS 0.04 08/24/2020     CMP:    Lab Results   Component Value Date     08/25/2020    K 4.0 08/25/2020    CL 93 08/25/2020    CO2 26 08/25/2020    BUN 16 08/25/2020    CREATININE 0.6 08/25/2020    GFRAA >60 08/25/2020    LABGLOM >60 08/25/2020    PROT 5.6 08/25/2020    LABALBU 2.6 08/25/2020    CALCIUM 9.2 08/25/2020    BILITOT 9.9 08/25/2020    ALKPHOS 658 08/25/2020     08/25/2020    ALT 74 08/25/2020     PT/INR:    Lab Results   Component Value Date    PROTIME 16.5 08/24/2020    INR 1.5 08/24/2020       Assessment:     Active Problems:    Jaundice    General weakness    Liver metastasis     Anus CA      Plan:   Continue same for liver biopsy

## 2020-08-26 NOTE — CONSULTS
Dept of Palliative Medicine   Consult Note      PATIENT: Jim Felix  : 1940  MRN: [id number]  ADMISSION DATE: 2020  1:03 PM    Alena Gracia is a 78 y.o. y/o female with a history of kidney stone, HTN, HLD, and left breast cancer in 2008. S/p left breast lumpectomy and axillary node dissection. She also has hx of anal SCC, s/p transanal excision on 3/25/2019 who [facility] to [facility] on 2020 with complains of fatigue and weakness, with possible liver mets. Palliative Medicine was consulted on hospital day 2 for assistance with Symptom management    Assessment / Recommendations    Pertinent Hospital Diagnoses      Possible Metastasis to liver, will go for biopsy   Jaundice    Hx of Breast cancer    Hx of anal cancer      Pain due to neoplasm  Patient cannot tolerate Morphine, Tramadol has not been helping her. - Start Oxycodone 5mg po q4h prn, if this dose is to strong, we can go to 2.5mg q4h prn  - Start Gabapentin 100mg HS       Thank you for the opportunity to participate in the care of Jim Felix. David Galarza MD  Palliative Medicine     Subjective     Met with Doroteo Maldonado and her Partner after her biopsy procedure. She told me that she has been hurting for months, but this past week has become very difficult for her. Says her pain is 10/10, it feels like someone is stabbing me. No radiation in her pain, localized to her lower back. Previously ice pack would alleviate her pain, not anymore. She says the tramadol has not been helping. The partner and her are very active, going to dances and parties. But has not been able to since the pain became severe. She is not complaining of nausea or constipation at this time.       Objective Data  /63   Pulse 94   Temp 97.8 °F (36.6 °C) (Temporal)   Resp 18   Ht 5' 2\" (1.575 m)   Wt 115 lb (52.2 kg)   SpO2 96%   BMI 21.03 kg/m²       Physical Examination:  Gen: elderly, thin, jaundice appearing   HEENT: normocephalic, atraumatic, PERRL, icteric   Lungs: respirations easy and not labored,  Heart: regular rate and rhythm, distant heart tones, no murmurs/rubs/gallops appreciated   Abdomen: normoactive bowel sounds, soft, non-tender, ,   Extremities: no clubbing, cyanosis or edema, moving all extremities    Skin: very jaundice appearing  Neuro: awake, alert, oriented x 3, follows commands, no gross neurologic deficit      I/O last 3 completed shifts: In: 1052 [P.O.:360; I.V.:692]  Out: -     Recent Labs     08/24/20  1215 08/25/20  0537   HGB 12.8 11.8   HCT 38.5 35.9   WBC 12.6* 11.0    172    133   K 2.5* 4.0   CL 84* 93*   CREATININE 0.7 0.6   BUN 22 16   INR 1.5  --        Time/Communication  Greater than 50% of time spent, total 25 minutes in counseling and coordination of care at the bedside regarding symptom management.

## 2020-08-26 NOTE — PRE SEDATION
Sedation Pre-Procedure Note    Patient Name: Alysa Guo   YOB: 1940  Room/Bed: 4503/9457-U  Medical Record Number: [id number]  Date: 8/26/2020   Time: 2:08 PM       Indication: 79 yo F with a history of breast and anal cancer now with multiple livers throughout the liver, concerning for metastatic disease. Consent: I have discussed with the patient and/or the patient representative the indication, alternatives, and the possible risks and/or complications of the planned procedure and the anesthesia methods. The patient and/or patient representative appear to understand and agree to proceed. Vital Signs:   Vitals:    08/26/20 1300   BP: 124/60   Pulse: 75   Resp: 18   Temp:    SpO2: 95%       Past Medical History:   has a past medical history of Breast cancer ([address] Hyperlipidemia, Hypertension, Kidney stone, and Water retention. Past Surgical History:   has a past surgical history that includes Hysterectomy; eye surgery (Bilateral); back surgery (1996); Breast lumpectomy (Left, 2008); Cystoscopy (6/18/2015); CT NEEDLE BIOPSY LIVER PERCUTANEOUS (8/26/2020); and CT NEEDLE BIOPSY LIVER PERCUTANEOUS (N/A, 08/26/2020). Medications:   Scheduled Meds:    docusate sodium  200 mg Oral Nightly     Continuous Infusions:    dextrose 5 % and 0.9 % NaCl 75 mL/hr at 08/25/20 3627     PRN Meds: traMADol  Home Meds:   Prior to Admission medications    Medication Sig Start Date End Date Taking? Authorizing Provider   docusate sodium (COLACE) 100 MG capsule Take 200 mg by mouth nightly   Yes Historical Provider, MD   hydrochlorothiazide (HYDRODIURIL) 25 MG tablet Take 25 mg by mouth daily.      Yes Historical Provider, MD   calcium carbonate 600 MG TABS tablet Take 1 tablet by mouth daily    Historical Provider, MD   omeprazole (PRILOSEC) 10 MG capsule Take 10 mg by mouth daily    Historical Provider, MD   vitamin B-12 (CYANOCOBALAMIN) 1000 MCG tablet Take 1,000 mcg by mouth daily    Historical Provider, MD magnesium gluconate (MAGONATE) 500 MG tablet Take 500 mg by mouth nightly    Historical Provider, MD   pravastatin (PRAVACHOL) 20 MG tablet Take 20 mg by mouth daily. Historical Provider, MD     Coumadin Use Last 7 Days:  no  Antiplatelet drug therapy use last 7 days: no  Other anticoagulant use last 7 days: no  Additional Medication Information:  n/a      Pre-Sedation Documentation and Exam:   I have personally completed a history, physical exam & review of systems for this patient (see notes).     Mallampati Airway Assessment:  Mallampati Class II - (soft palate, fauces & uvula are visible)    Prior History of Anesthesia Complications:   none    ASA Classification:  Class 3 - A patient with severe systemic disease that limits activity but is not incapacitating    Sedation/ Anesthesia Plan:   intravenous sedation    Medications Planned:   midazolam (Versed) intravenously and fentanyl intravenously    Patient is an appropriate candidate for plan of sedation: yes    Electronically signed by Rosalia Brunner, MD on 8/26/2020 at 2:08 PM

## 2020-08-27 NOTE — PROGRESS NOTES
Hepatobiliary and Pancreatic Surgery Progress Note    CC: abdominal discomfort    Subjective: Patient doing well, abdominal pain improved. She states that she has some discomfort. He had her biopsy yesterday along with a CT chest     OBJECTIVE      Physical    VITALS:  BP (!) 148/63   Pulse 91   Temp 98 °F (36.7 °C) (Temporal)   Resp 18   Ht 5' 2\" (1.575 m)   Wt 115 lb (52.2 kg)   SpO2 96%   BMI 21.03 kg/m²     General appearance: appears in no acute distress  Lungs:CTABL  Heart: RRR  Abdomen: soft, nondistended, nontympanic, no guarding, no peritoneal signs, normoactive bowel sounds  Extremities:no peripheral edema    ASSESSMENT: Metastatic cancer to the liver with obstructive jaundice secondary to tumor replacement of the liver. No evidence of extrahepatic biliary obstruction    PLAN:    - await biopsies  - patient aware of what is going on  - prognosis does not look good    Thank you for the consultation and allowing me to take part in Ms. Sanon's care.       [address] [facility] 8/27/2020 11:38 AM

## 2020-08-27 NOTE — PROGRESS NOTES
Admit Date: 8/24/2020    Subjective:     Feels better sitting edge ob bed eating breakfast ,had biopsy yesterday     Objective:     Patient Vitals for the past 8 hrs:   BP Temp Temp src Pulse Resp SpO2   08/27/20 0755 (!) 148/63 98 °F (36.7 °C) Temporal 91 18 96 %     I/O last 3 completed shifts: In: 1740 [I.V.:1740]  Out: -   No intake/output data recorded. HEENT: Normal  NECK: Thyroid normal. No carotid bruit. No lymphphadenopathy. CVS: RRR  RS: Clear. No wheeze. No rhonchi. Good airflow bilaterally. ABD: Soft. Non tender. No mass. Normal BS.hepatomegaly   EXT: No edema. Non tender. Pulses present.    NEURO: Intact      Scheduled Meds:   gabapentin  100 mg Oral Nightly    docusate sodium  200 mg Oral Nightly     Continuous Infusions:   dextrose 5 % and 0.9 % NaCl 75 mL/hr at 08/27/20 0133       CBC with Differential:    Lab Results   Component Value Date    WBC 11.0 08/25/2020    RBC 3.76 08/25/2020    HGB 11.8 08/25/2020    HCT 35.9 08/25/2020     08/25/2020    MCV 95.5 08/25/2020    MCH 31.4 08/25/2020    MCHC 32.9 08/25/2020    RDW 23.5 08/25/2020    LYMPHOPCT 7.3 08/24/2020    MONOPCT 9.2 08/24/2020    BASOPCT 0.3 08/24/2020    MONOSABS 1.16 08/24/2020    LYMPHSABS 0.92 08/24/2020    EOSABS 0.05 08/24/2020    BASOSABS 0.04 08/24/2020     CMP:    Lab Results   Component Value Date     08/25/2020    K 4.0 08/25/2020    CL 93 08/25/2020    CO2 26 08/25/2020    BUN 16 08/25/2020    CREATININE 0.6 08/25/2020    GFRAA >60 08/25/2020    LABGLOM >60 08/25/2020    PROT 5.2 08/27/2020    LABALBU 2.7 08/27/2020    CALCIUM 9.2 08/25/2020    BILITOT 9.4 08/27/2020    ALKPHOS 587 08/27/2020     08/27/2020    ALT 77 08/27/2020     PT/INR:    Lab Results   Component Value Date    PROTIME 16.3 08/26/2020    INR 1.4 08/26/2020       Assessment:     Active Problems:    Jaundice    General weakness    Liver metastasis     Breast CA    Anus CA      Plan:   Continue same home when cleared by oncology

## 2020-08-27 NOTE — PROGRESS NOTES
Dept of Palliative Medicine   Consult Note      PATIENT: Yared Bolanos  : 1940  MRN: [id number]  ADMISSION DATE: 2020  1:03 PM    Shanti Yan is a 78 y.o. y/o female with a history of kidney stone, HTN, HLD, and left breast cancer in 2008. S/p left breast lumpectomy and axillary node dissection. She also has hx of anal SCC, s/p transanal excision on 3/25/2019 who presented to [facility]) on 2020 with complains of fatigue and weakness, with possible liver mets. Palliative Medicine following for assistance with Symptom management    Assessment / Recommendations    Pertinent Hospital Diagnoses      Possible Metastasis to liver, will go for biopsy   Jaundice    Hx of Breast cancer    Hx of anal cancer      Pain due to neoplasm  Patient cannot tolerate Morphine, Tramadol has not been helping her. - Cont Oxycodone 5mg po q4h prn, she only used 2 pills yesterday & 1 this morning. Total 24hr OME 22.5  - Cont Gabapentin 100mg HS   - Recommend adding senna-s 1 tab bid for bowel regimen. Last BM was 3 days ago. Thank you for the opportunity to participate in the care of Yared Bolanos. Jocelyn Beltran MD  Palliative Medicine     Subjective     Met with Brissa at bedside this morning. She says she had a restful sleep. The Oxycodone has helped her with pain. She is now able to stretch without much pain. Able to walk around without much difficulty. It is difficult for her to put a number on her pain. I asked if she would want to follow with us in palliative clinic. She will give me an answer tomorrow to see her response to medication. She is person who does not like using medications when not needed.      Objective Data  BP (!) 148/63   Pulse 91   Temp 98 °F (36.7 °C) (Temporal)   Resp 18   Ht 5' 2\" (1.575 m)   Wt 115 lb (52.2 kg)   SpO2 96%   BMI 21.03 kg/m²       Physical Examination:  Gen: elderly, thin, jaundice appearing   HEENT: normocephalic, atraumatic, PERRL, icteric   Lungs: respirations easy and not labored,  Heart: regular rate and rhythm,   Abdomen: normoactive bowel sounds, soft, non-tender, ,   Extremities: no clubbing, cyanosis or edema, moving all extremities    Skin: very jaundice appearing  Neuro: awake, alert, oriented x 3, follows commands      I/O last 3 completed shifts: In: 1740 [I.V.:1740]  Out: -     Recent Labs     08/24/20  1215 08/25/20  0537 08/26/20  0709   HGB 12.8 11.8  --    HCT 38.5 35.9  --    WBC 12.6* 11.0  --     172  --     133  --    K 2.5* 4.0  --    CL 84* 93*  --    CREATININE 0.7 0.6  --    BUN 22 16  --    INR 1.5  --  1.4       Time/Communication  Greater than 50% of time spent, total 10 minutes in counseling and coordination of care at the bedside regarding symptom management.

## 2020-08-28 PROBLEM — C80.1 OBSTRUCTIVE JAUNDICE DUE TO MALIGNANT NEOPLASM (HCC): Status: ACTIVE | Noted: 2020-01-01

## 2020-08-28 PROBLEM — C78.7 METASTATIC CANCER TO LIVER (HCC): Status: ACTIVE | Noted: 2020-01-01

## 2020-08-28 PROBLEM — K83.1 OBSTRUCTIVE JAUNDICE DUE TO MALIGNANT NEOPLASM (HCC): Status: ACTIVE | Noted: 2020-01-01

## 2020-08-28 NOTE — DISCHARGE SUMMARY
Physician Discharge Summary     Patient ID:  Marek Veras  [id number]  12 y.o.  1940    Admit date: 8/24/2020    Discharge date and time: 8/28/2020    Admitting Physician: Saundra Orozco MD     Discharge Physician: Saundra Orozco MD    Admission Diagnoses: Jaundice [R17]    Discharge Diagnoses: liver metastasis ?  Primary  Jaundice   Breast Ca  Anus CA  HTN    Discharged Condition: fair    Hospital Course: admitted with jaundice weakness found with liver metastasis history of breast and anus CA seen by oncology surgery as well as intervention radiology had liver biopsy pathology pending was stable discharged to follow with oncology     Consults: hematology/oncology, general surgery and intervention radiology     Disposition: home    In process/preliminary results:  Outstanding Order Results     Date and Time Order Name Status Description    8/26/2020 0709 AFP TUMOR MARKER In process     8/26/2020 0709 Cancer Antigen 15-3 In process     8/26/2020 0000 Surgical Pathology In process           Patient Instructions:   Current Discharge Medication List      CONTINUE these medications which have NOT CHANGED    Details   docusate sodium (COLACE) 100 MG capsule Take 200 mg by mouth nightly         STOP taking these medications       calcium carbonate 600 MG TABS tablet Comments:   Reason for Stopping:         omeprazole (PRILOSEC) 10 MG capsule Comments:   Reason for Stopping:         vitamin B-12 (CYANOCOBALAMIN) 1000 MCG tablet Comments:   Reason for Stopping:         magnesium gluconate (MAGONATE) 500 MG tablet Comments:   Reason for Stopping:         pravastatin (PRAVACHOL) 20 MG tablet Comments:   Reason for Stopping:         hydrochlorothiazide (HYDRODIURIL) 25 MG tablet Comments:   Reason for Stopping:             Activity: activity as tolerated  Diet: regular diet  Wound Care: none needed    Signed:  Saundra Orozco MD.  8/28/2020  10:50 AM

## 2020-08-28 NOTE — PROGRESS NOTES
edema, moving all extremities    Skin: very jaundice appearing  Neuro: awake, alert, oriented x 3, follows commands      No intake/output data recorded. Recent Labs     08/26/20  0709   INR 1.4       Time/Communication  Greater than 50% of time spent, total 20 minutes in counseling and coordination of care at the bedside regarding symptom management.

## 2020-08-28 NOTE — PROGRESS NOTES
Admit Date: 8/24/2020    Subjective:     Feels fine no complaint tolerating diet well     Objective:     No data found. I/O last 3 completed shifts: In: 2171 [P.O.:1080; I.V.:1091]  Out: -   No intake/output data recorded. HEENT: Normal  NECK: Thyroid normal. No carotid bruit. No lymphphadenopathy. CVS: RRR  RS: Clear. No wheeze. No rhonchi. Good airflow bilaterally. ABD: Soft. Non tender. No mass. Normal BS. EXT: No edema. Non tender. Pulses present. NEURO:no focal deficit       Scheduled Meds:   gabapentin  100 mg Oral Nightly    docusate sodium  200 mg Oral Nightly     Continuous Infusions:   dextrose 5 % and 0.9 % NaCl 75 mL/hr at 08/28/20 0545       CBC with Differential:    Lab Results   Component Value Date    WBC 11.0 08/25/2020    RBC 3.76 08/25/2020    HGB 11.8 08/25/2020    HCT 35.9 08/25/2020     08/25/2020    MCV 95.5 08/25/2020    MCH 31.4 08/25/2020    MCHC 32.9 08/25/2020    RDW 23.5 08/25/2020    LYMPHOPCT 7.3 08/24/2020    MONOPCT 9.2 08/24/2020    BASOPCT 0.3 08/24/2020    MONOSABS 1.16 08/24/2020    LYMPHSABS 0.92 08/24/2020    EOSABS 0.05 08/24/2020    BASOSABS 0.04 08/24/2020     CMP:    Lab Results   Component Value Date     08/25/2020    K 4.0 08/25/2020    CL 93 08/25/2020    CO2 26 08/25/2020    BUN 16 08/25/2020    CREATININE 0.6 08/25/2020    GFRAA >60 08/25/2020    LABGLOM >60 08/25/2020    PROT 5.3 08/28/2020    LABALBU 2.5 08/28/2020    CALCIUM 9.2 08/25/2020    BILITOT 10.4 08/28/2020    ALKPHOS 638 08/28/2020     08/28/2020    ALT 96 08/28/2020     PT/INR:    Lab Results   Component Value Date    PROTIME 16.3 08/26/2020    INR 1.4 08/26/2020     Liver biopsy pending    Assessment:     Active Problems:    Jaundice    General weakness    Liver metastasis ?  Primary     Breast CA    Anus CA      Plan:   Stable to discharge  follow as outpatient with oncology

## 2020-09-02 NOTE — ED PROVIDER NOTES
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 On presentation to the emergency department patient was noted to be jaundiced. She complained of fatigue and weakness like never before. Patient did not complain of any pain. She did not have lower extremity. Patient was awake alert oriented but fatigued. Back Pain      Review of Systems   Constitutional: Positive for activity change and fatigue. Musculoskeletal: Positive for back pain. All other systems reviewed and are negative. Physical Exam  Vitals signs and nursing note reviewed. Constitutional:       Appearance: She is ill-appearing. HENT:      Head: Normocephalic and atraumatic. Eyes:      General: Scleral icterus present. Cardiovascular:      Rate and Rhythm: Normal rate and regular rhythm. Pulses: Normal pulses. Heart sounds: Normal heart sounds. Pulmonary:      Effort: Pulmonary effort is normal.      Breath sounds: Normal breath sounds. Abdominal:      Palpations: There is mass (R sided). Skin:     General: Skin is warm and dry. Capillary Refill: Capillary refill takes less than 2 seconds. Coloration: Skin is jaundiced. Neurological:      Mental Status: She is alert. Motor: Weakness present. Procedures     MDM            --------------------------------------------- PAST HISTORY ---------------------------------------------  Past Medical History:  has a past medical history of Breast cancer ([facility] [address] Hyperlipidemia, Hypertension, Kidney stone, and Water retention. Past Surgical History:  has a past surgical history that includes Hysterectomy; eye surgery (Bilateral); back surgery (1996); Breast lumpectomy (Left, 2008); Cystoscopy (6/18/2015); CT NEEDLE BIOPSY LIVER PERCUTANEOUS (8/26/2020); and CT NEEDLE BIOPSY LIVER PERCUTANEOUS (N/A, 08/26/2020).     Social History: reports that she has never smoked. She has never used smokeless tobacco. She reports current alcohol use. She reports that she does not use drugs. Family History: family history is not on file. The patients home medications have been reviewed.     Allergies: Morphine and Reglan [metoclopramide]    -------------------------------------------------- RESULTS -------------------------------------------------    LABS:  Results for orders placed or performed during the hospital encounter of 08/24/20   Comprehensive Metabolic Panel   Result Value Ref Range    Sodium 133 132 - 146 mmol/L    Potassium 2.5 (LL) 3.5 - 5.0 mmol/L    Chloride 84 (L) 98 - 107 mmol/L    CO2 31 (H) 22 - 29 mmol/L    Anion Gap 18 (H) 7 - 16 mmol/L    Glucose 84 74 - 99 mg/dL    BUN 22 8 - 23 mg/dL    CREATININE 0.7 0.5 - 1.0 mg/dL    GFR Non-African American >60 >=60 mL/min/1.73    GFR African American >60     Calcium 10.3 (H) 8.6 - 10.2 mg/dL    Total Protein 6.3 (L) 6.4 - 8.3 g/dL    Alb 3.2 (L) 3.5 - 5.2 g/dL    Total Bilirubin 10.8 (H) 0.0 - 1.2 mg/dL    Alkaline Phosphatase 771 (H) 35 - 104 U/L    ALT 99 (H) 0 - 32 U/L     (H) 0 - 31 U/L   CBC Auto Differential   Result Value Ref Range    WBC 12.6 (H) 4.5 - 11.5 E9/L    RBC 4.09 3.50 - 5.50 E12/L    Hemoglobin 12.8 11.5 - 15.5 g/dL    Hematocrit 38.5 34.0 - 48.0 %    MCV 94.1 80.0 - 99.9 fL    MCH 31.3 26.0 - 35.0 pg    MCHC 33.2 32.0 - 34.5 %    RDW 22.8 (H) 11.5 - 15.0 fL    Platelets 033 664 - 528 E9/L    MPV 11.5 7.0 - 12.0 fL    Neutrophils % 81.5 (H) 43.0 - 80.0 %    Immature Granulocytes % 1.3 0.0 - 5.0 %    Lymphocytes % 7.3 (L) 20.0 - 42.0 %    Monocytes % 9.2 2.0 - 12.0 %    Eosinophils % 0.4 0.0 - 6.0 %    Basophils % 0.3 0.0 - 2.0 %    Neutrophils Absolute 10.30 (H) 1.80 - 7.30 E9/L    Immature Granulocytes # 0.17 E9/L    Lymphocytes Absolute 0.92 (L) 1.50 - 4.00 E9/L    Monocytes Absolute 1.16 (H) 0.10 - 0.95 E9/L    Eosinophils Absolute 0.05 0.05 - 0.50 E9/L Basophils Absolute 0.04 0.00 - 0.20 E9/L    Anisocytosis 3+     Polychromasia 2+     Poikilocytes 2+     Schistocytes 1+     Ovalocytes 2+     Target Cells 1+    Lipase   Result Value Ref Range    Lipase 24 13 - 60 U/L   Troponin   Result Value Ref Range    Troponin <0.01 0.00 - 0.03 ng/mL   Amylase   Result Value Ref Range    Amylase 34 20 - 100 U/L   Lactic Acid, Plasma   Result Value Ref Range    Lactic Acid 3.2 (H) 0.5 - 2.2 mmol/L   Urinalysis with Microscopic   Result Value Ref Range    Color, UA Yellow Straw/Yellow    Clarity, UA Clear Clear    Glucose, Ur Negative Negative mg/dL    Bilirubin Urine LARGE (A) Negative    Ketones, Urine Negative Negative mg/dL    Specific Gravity, UA <=1.005 1.005 - 1.030    Blood, Urine MODERATE (A) Negative    pH, UA 7.5 5.0 - 9.0    Protein, UA Negative Negative mg/dL    Urobilinogen, Urine >=8.0 (A) <2.0 E.U./dL    Nitrite, Urine Negative Negative    Leukocyte Esterase, Urine MODERATE (A) Negative    WBC, UA 5-10 (A) 0 - 5 /HPF    RBC, UA 5-10 (A) 0 - 2 /HPF    Bacteria, UA FEW (A) None Seen /HPF   Protime-INR   Result Value Ref Range    Protime 16.5 (H) 9.3 - 12.4 sec    INR 1.5    Ammonia   Result Value Ref Range    Ammonia 35.0 11.0 - 51.0 umol/L   Brain Natriuretic Peptide   Result Value Ref Range    Pro- (H) 0 - 450 pg/mL   COMPREHENSIVE METABOLIC PANEL   Result Value Ref Range    Sodium 133 132 - 146 mmol/L    Potassium 4.0 3.5 - 5.0 mmol/L    Chloride 93 (L) 98 - 107 mmol/L    CO2 26 22 - 29 mmol/L    Anion Gap 14 7 - 16 mmol/L    Glucose 77 74 - 99 mg/dL    BUN 16 8 - 23 mg/dL    CREATININE 0.6 0.5 - 1.0 mg/dL    GFR Non-African American >60 >=60 mL/min/1.73    GFR African American >60     Calcium 9.2 8.6 - 10.2 mg/dL    Total Protein 5.6 (L) 6.4 - 8.3 g/dL    Alb 2.6 (L) 3.5 - 5.2 g/dL    Total Bilirubin 9.9 (H) 0.0 - 1.2 mg/dL    Alkaline Phosphatase 658 (H) 35 - 104 U/L    ALT 74 (H) 0 - 32 U/L     (H) 0 - 31 U/L   CBC   Result Value Ref Range WBC 11.0 4.5 - 11.5 E9/L    RBC 3.76 3.50 - 5.50 E12/L    Hemoglobin 11.8 11.5 - 15.5 g/dL    Hematocrit 35.9 34.0 - 48.0 %    MCV 95.5 80.0 - 99.9 fL    MCH 31.4 26.0 - 35.0 pg    MCHC 32.9 32.0 - 34.5 %    RDW 23.5 (H) 11.5 - 15.0 fL    Platelets 830 510 - 351 E9/L    MPV 11.7 7.0 - 12.0 fL   CEA   Result Value Ref Range    .5 (H) 0.0 - 5.2 ng/mL   Cancer Antigen 15-3   Result Value Ref Range    CA 15-3 117 (H) 0 - 31 U/mL   AFP TUMOR MARKER   Result Value Ref Range    AFP-Tumor Marker 8 0 - 9 ng/mL   Bilirubin, direct   Result Value Ref Range    Bilirubin, Direct 6.7 (H) 0.0 - 0.3 mg/dL   Hepatic function panel   Result Value Ref Range    Total Protein 5.5 (L) 6.4 - 8.3 g/dL    Alb 2.8 (L) 3.5 - 5.2 g/dL    Alkaline Phosphatase 668 (H) 35 - 104 U/L    ALT 74 (H) 0 - 32 U/L     (H) 0 - 31 U/L    Total Bilirubin 9.6 (H) 0.0 - 1.2 mg/dL    Bilirubin, Direct 7.4 (H) 0.0 - 0.3 mg/dL    Bilirubin, Indirect 2.2 (H) 0.0 - 1.0 mg/dL   Protime-INR   Result Value Ref Range    Protime 16.3 (H) 9.3 - 12.4 sec    INR 1.4    Hepatic function panel   Result Value Ref Range    Total Protein 5.2 (L) 6.4 - 8.3 g/dL    Alb 2.7 (L) 3.5 - 5.2 g/dL    Alkaline Phosphatase 587 (H) 35 - 104 U/L    ALT 77 (H) 0 - 32 U/L     (H) 0 - 31 U/L    Total Bilirubin 9.4 (H) 0.0 - 1.2 mg/dL    Bilirubin, Direct 7.3 (H) 0.0 - 0.3 mg/dL    Bilirubin, Indirect 2.1 (H) 0.0 - 1.0 mg/dL   Hepatic function panel   Result Value Ref Range    Total Protein 5.3 (L) 6.4 - 8.3 g/dL    Alb 2.5 (L) 3.5 - 5.2 g/dL    Alkaline Phosphatase 638 (H) 35 - 104 U/L    ALT 96 (H) 0 - 32 U/L     (H) 0 - 31 U/L    Total Bilirubin 10.4 (H) 0.0 - 1.2 mg/dL    Bilirubin, Direct 8.6 (H) 0.0 - 0.3 mg/dL    Bilirubin, Indirect 1.8 (H) 0.0 - 1.0 mg/dL   TYPE AND SCREEN   Result Value Ref Range    ABO/Rh B POS     Antibody Screen NEG        RADIOLOGY:  CT CHEST W CONTRAST   Final Result   Multiple bilateral pulmonary nodules with  progression concerning counseling regarding the diagnosis and prognosis. Their questions are answered at this time and they are agreeable with the plan of admission.    --------------------------------- ADDITIONAL PROVIDER NOTES ---------------------------------  Consultations:  Time: . Spoke with Dr. Aman Mueller. Discussed case. They will admit the patient. This patient's ED course included: a personal history and physicial examination, IV medications and continuous pulse oximetry    This patient has remained hemodynamically stable during their ED course. Diagnosis:  1. General weakness    2. Jaundice, non-    3. Pain, cancer    4. Metastatic cancer to liver ([facility] [address]    5. Metastatic disease ([facility] [address]    6. Hypokalemia        Disposition:  Patient's disposition: Admit to telemetry  Patient's condition is stable. Irving Lemos MD  Resident  20    ATTENDING PROVIDER ATTESTATION:     Geno Hebert presented to the emergency department for evaluation of Back Pain (sent by Dr Yousif Oneil for multiple complaints pt states she is having back pain that radiates around her waist pt appears jaundiced)   and was initially evaluated by the Medical Resident. See Original ED Note for H&P and ED course above. I have reviewed and discussed the case, including pertinent history (medical, surgical, family and social) and exam findings with the Medical Resident assigned to Geno Hebert. I have personally performed and/or participated in the history, exam, medical decision making, and procedures and agree with all pertinent clinical information. I, Dr. Elijah Walker, am the primary provider of record    Patient presenting with chronic, gradual onset, worsening, moderate to severe, associated jaundice, nothing improving or making it worse, nothing specific taken at home, jaundice and weakness.      I have reviewed my findings and recommendations with the assigned Medical Resident, Geno Hebert and members of family present at the time of disposition. My findings/plan: The primary encounter diagnosis was General weakness. Diagnoses of Jaundice, non-, Pain, cancer, Metastatic cancer to liver [facility]), Metastatic disease ([facility] [address] and Hypokalemia were also pertinent to this visit. Discharge Medication List as of 2020  1:47 PM      START taking these medications    Details   oxyCODONE (ROXICODONE) 5 MG immediate release tablet Take 1 tablet by mouth every 4 hours as needed for Pain for up to 3 days. , Disp-48 tablet,R-0Normal      gabapentin (NEURONTIN) 100 MG capsule Take 1 capsule by mouth nightly for 14 days. , Disp-14 capsule,R-3Normal           Gila Ferrari, DO Gila Ferrari DO  20 1545

## 2020-09-03 NOTE — PROGRESS NOTES
Telephone encounter, She will be seen on 9/14. Ran out of medications. Her medications were refilled.  [facility] reviewed

## 2020-09-10 NOTE — PROGRESS NOTES
Hepatobiliary and Pancreatic Surgery Progress Note    CC: follow up from hospital    Subjective: Patient not doing well. Increased weightloss with abdominal swelling. Lack of appetite. 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 - has less than 1 month survival    Thank you for the consultation and allowing me to take part in Ms. Sanon's care.       Johnny Ponce 9/10/2020 12:32 PM

## 2020-09-10 NOTE — TELEPHONE ENCOUNTER
Fozia from [facility] called and stated they set up an admission visit for the patient for today between 6:00 pm and 6:30 pm. Dr Apurva Bryan also notified.   Electronically signed by Sary Palumbo MA on 9/10/2020 at 2:48 PM

## 2021-10-30 NOTE — ED PROVIDER NOTES
Initial /CM Assessment/Plan of Care Note     Baseline Assessment / Progress Note  35 year old admitted 10/30/2021 as Observation with a diagnosis of attempted suicide. Prior to admission patient was living with   and residing at  .  Patient does not  have a Power of  for Healthcare. Patient’s Primary Care Provider is No primary care provider on file.     Case opened from house supervisor request for discharge planning received 10/30/2021. Records reviewed and initial assessment completed.  Patient representative is not specified. Face to face deferred for now.       Notified by house supervisor that patient will need inpatient psychiatric placement due to attempted suicide. Placed phone call to patient's  at [facility] (Susan #[phone]) to discuss. She states that patient is under a [address] 51 emergency care home hold with [employer] (Officer Laurat #[phone] at bedside).  has requested that referrals be faxed to the following:     · [facility] (#[phone], fax #[phone])   · [facility] (#[phone], fax #[phone])  · [facility] (#[phone], fax #[phone]) - openings in [facility]     Faxed as requested at [phone].     Spoke with  intake staff (Kasi #[phone]), who states that he is receiving the referral currently ([id number]) and will have their psychiatrist contact Dr. Green for doctor-to-doctor review.     After social work coverage hours, referral follow up can be called directly to facility phone numbers above. Please also notify [facility] (#[phone]) of any accepting facilities.         Medical History  No past medical history on file.    Prior to Admission Status       Agency/Support  Type of Services Prior to Hospitalization:    Support Systems:    Home Devices/Equipment:    Mobility Assist Devices:    Sensory Support Devices:      Current  Resp 16   Ht 5' 2\" (1.575 m)   Wt 119 lb (54 kg)   SpO2 100%   BMI 21.77 kg/m²   Oxygen Saturation Interpretation: Normal      ---------------------------------------------------PHYSICAL EXAM--------------------------------------      Constitutional/General: Alert and oriented x3, well appearing, non toxic in NAD  Head: There is a tender 5 cm swollen area on the occipital scalp  Eyes: PERRL, EOMI    Neck: Supple, full ROM, no meningeal signs  Pulmonary: Lungs clear to auscultation bilaterally, no wheezes, rales, or rhonchi. Not in respiratory distress  Cardiovascular:  Regular rate and rhythm, no murmurs, gallops, or rubs. 2+ distal pulses     Extremities: Moves all extremities x 4. Warm and well perfused  Skin: warm and dry without rash  Neurologic: GCS 15,  Psych: Normal Affect      ------------------------------ ED COURSE/MEDICAL DECISION MAKING----------------------  Medications - No data to display      Medical Decision Making:          Counseling: The emergency provider has spoken with the patient and family member patient and spouse and discussed todays results, in addition to providing specific details for the plan of care and counseling regarding the diagnosis and prognosis. Questions are answered at this time and they are agreeable with the plan.      --------------------------------- IMPRESSION AND DISPOSITION ---------------------------------    IMPRESSION  1. Closed head injury, initial encounter    2.  Contusion of scalp, initial encounter        DISPOSITION  Disposition: Discharge to home  Patient condition is stable                  Andrew Casey MD  09/08/19 9389 Status  PT Ambulation Tips:    PT Transfer Tips:     OT Bathing Tips:    OT Dressing Tips:    OT Toileting Tips:    OT Feeding Tips:    SLP Swallow/Feeding Tips:    SLP Comm/Cog Tips:    Current Mental Status:    Stressors:      Insurance  Primary: N/A  Secondary: N/A    Barriers to Discharge  Identified Barriers to Discharge/Transition Planning:      Progress Note  See above.     Plan  SW/CM - Recommendations for Discharge: Other (comment) (inpatient psychiatric treatment)   PT - Recommendations for Discharge:      OT - Recommendations for Discharge:      SLP - Recommendations for Discharge:     Anticipate patient will need post-hospital services. Necessary services are available.  Anticipate patient cannot return to the environment from which patient entered the hospital.   Anticipate patient cannot provide self-care at discharge.    Refer to SW/CM Flowsheet for Goals and objective data.

## 2023-08-30 NOTE — PLAN OF CARE
Problem: Falls - Risk of:  Goal: Will remain free from falls  Description: Will remain free from falls  Outcome: Met This Shift  Goal: Absence of physical injury  Description: Absence of physical injury  Outcome: Met This Shift     Problem: Pain:  Goal: Pain level will decrease  Description: Pain level will decrease  Outcome: Met This Shift  Goal: Control of acute pain  Description: Control of acute pain  Outcome: Met This Shift  Goal: Control of chronic pain  Description: Control of chronic pain  Outcome: Met This Shift Eyes:      Conjunctiva/sclera: Conjunctivae normal.   Neck:      Thyroid: No thyromegaly. Vascular: No JVD. Trachea: No tracheal deviation. Cardiovascular:      Rate and Rhythm: Normal rate and regular rhythm. Chest Wall: PMI is not displaced. Pulses: No decreased pulses. Heart sounds: Murmur heard. Early systolic murmur is present at the upper right sternal border. No gallop. Pulmonary:      Effort: No respiratory distress. Breath sounds: No wheezing or rales. Chest:      Chest wall: No tenderness. Abdominal:      Palpations: Abdomen is soft. Tenderness: There is no abdominal tenderness. Musculoskeletal:      Cervical back: Neck supple. Skin:     General: Skin is warm. Neurological:      Mental Status: She is alert and oriented to person, place, and time. Ms. Chung Mittal has a reminder for a \"due or due soon\" health maintenance. I have asked that she contact her primary care provider for follow-up on this health maintenance. No flowsheet data found. Impression:cta-2014    Small amount of residual thrombus remains in the left lateral wall of the distal thoracic aorta. The large luminal filling defect is no longer evident. No new filling defects. 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 Mesenteric narrowings as above similar to prior exam.    Resolving splenic infarct. No recurrence of pancreatitis. Hepatic steatosis. 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 Interpretation Summary 9/2018  Calculated left ventricular ejection fraction is 55%. Normal left ventricular wall motion, no regional wall motion abnormality noted. Mild (grade 1) left ventricular diastolic dysfunction. Trace mitral valve regurgitation. Trivial pericardial effusion. Effusion is is fibrinous. Procedure Conclusion 9/2018  Nuclear Stress Test   Negative myocardial perfusion imaging.  Myocardial